# Patient Record
Sex: FEMALE | Race: WHITE | NOT HISPANIC OR LATINO
[De-identification: names, ages, dates, MRNs, and addresses within clinical notes are randomized per-mention and may not be internally consistent; named-entity substitution may affect disease eponyms.]

---

## 2017-02-08 ENCOUNTER — APPOINTMENT (OUTPATIENT)
Dept: GYNECOLOGIC ONCOLOGY | Facility: CLINIC | Age: 67
End: 2017-02-08

## 2017-02-08 VITALS
DIASTOLIC BLOOD PRESSURE: 89 MMHG | SYSTOLIC BLOOD PRESSURE: 158 MMHG | WEIGHT: 153 LBS | BODY MASS INDEX: 23.19 KG/M2 | HEART RATE: 73 BPM | HEIGHT: 68 IN

## 2017-03-07 ENCOUNTER — FORM ENCOUNTER (OUTPATIENT)
Age: 67
End: 2017-03-07

## 2017-03-08 ENCOUNTER — APPOINTMENT (OUTPATIENT)
Dept: MAMMOGRAPHY | Facility: CLINIC | Age: 67
End: 2017-03-08

## 2017-03-08 ENCOUNTER — OUTPATIENT (OUTPATIENT)
Dept: OUTPATIENT SERVICES | Facility: HOSPITAL | Age: 67
LOS: 1 days | End: 2017-03-08
Payer: COMMERCIAL

## 2017-03-08 DIAGNOSIS — C48.2 MALIGNANT NEOPLASM OF PERITONEUM, UNSPECIFIED: ICD-10-CM

## 2017-03-08 PROCEDURE — 77063 BREAST TOMOSYNTHESIS BI: CPT

## 2017-03-08 PROCEDURE — 77067 SCR MAMMO BI INCL CAD: CPT

## 2017-05-08 ENCOUNTER — OUTPATIENT (OUTPATIENT)
Dept: OUTPATIENT SERVICES | Facility: HOSPITAL | Age: 67
LOS: 1 days | Discharge: HOME | End: 2017-05-08

## 2017-05-08 DIAGNOSIS — C48.2 MALIGNANT NEOPLASM OF PERITONEUM, UNSPECIFIED: ICD-10-CM

## 2017-05-09 ENCOUNTER — APPOINTMENT (OUTPATIENT)
Dept: HEMATOLOGY ONCOLOGY | Facility: CLINIC | Age: 67
End: 2017-05-09

## 2017-05-09 ENCOUNTER — RESULT REVIEW (OUTPATIENT)
Age: 67
End: 2017-05-09

## 2017-05-09 VITALS
DIASTOLIC BLOOD PRESSURE: 70 MMHG | RESPIRATION RATE: 14 BRPM | OXYGEN SATURATION: 99 % | SYSTOLIC BLOOD PRESSURE: 120 MMHG | WEIGHT: 152.12 LBS | HEART RATE: 70 BPM | TEMPERATURE: 98.6 F | BODY MASS INDEX: 23.13 KG/M2

## 2017-05-09 LAB
HCT VFR BLD CALC: 41.6 % — SIGNIFICANT CHANGE UP (ref 34.5–45)
HGB BLD-MCNC: 14 G/DL — SIGNIFICANT CHANGE UP (ref 11.5–15.5)
MCHC RBC-ENTMCNC: 31.1 PG — SIGNIFICANT CHANGE UP (ref 27–34)
MCHC RBC-ENTMCNC: 33.6 G/DL — SIGNIFICANT CHANGE UP (ref 32–36)
MCV RBC AUTO: 92.5 FL — SIGNIFICANT CHANGE UP (ref 80–100)
PLATELET # BLD AUTO: 181 K/UL — SIGNIFICANT CHANGE UP (ref 150–400)
RBC # BLD: 4.5 M/UL — SIGNIFICANT CHANGE UP (ref 3.8–5.2)
RBC # FLD: 11.4 % — SIGNIFICANT CHANGE UP (ref 10.3–14.5)
WBC # BLD: 6.1 K/UL — SIGNIFICANT CHANGE UP (ref 3.8–10.5)
WBC # FLD AUTO: 6.1 K/UL — SIGNIFICANT CHANGE UP (ref 3.8–10.5)

## 2017-05-09 RX ORDER — FLUTICASONE PROPIONATE 50 UG/1
50 SPRAY, METERED NASAL
Qty: 16 | Refills: 0 | Status: ACTIVE | COMMUNITY
Start: 2016-12-12

## 2017-05-10 LAB
ALBUMIN SERPL ELPH-MCNC: 4.5 G/DL
ALP BLD-CCNC: 75 U/L
ALT SERPL-CCNC: 28 U/L
ANION GAP SERPL CALC-SCNC: 16 MMOL/L
AST SERPL-CCNC: 29 U/L
BILIRUB SERPL-MCNC: 1 MG/DL
BUN SERPL-MCNC: 22 MG/DL
CALCIUM SERPL-MCNC: 9.9 MG/DL
CANCER AG125 SERPL-ACNC: 8 U/ML
CHLORIDE SERPL-SCNC: 103 MMOL/L
CO2 SERPL-SCNC: 26 MMOL/L
CREAT SERPL-MCNC: 1 MG/DL
GLUCOSE SERPL-MCNC: 80 MG/DL
POTASSIUM SERPL-SCNC: 4.3 MMOL/L
PROT SERPL-MCNC: 6.8 G/DL
SODIUM SERPL-SCNC: 145 MMOL/L

## 2017-06-26 ENCOUNTER — RESULT REVIEW (OUTPATIENT)
Age: 67
End: 2017-06-26

## 2017-08-09 ENCOUNTER — APPOINTMENT (OUTPATIENT)
Dept: GYNECOLOGIC ONCOLOGY | Facility: CLINIC | Age: 67
End: 2017-08-09
Payer: COMMERCIAL

## 2017-08-09 VITALS
DIASTOLIC BLOOD PRESSURE: 88 MMHG | HEART RATE: 76 BPM | WEIGHT: 148 LBS | BODY MASS INDEX: 22.43 KG/M2 | HEIGHT: 68 IN | SYSTOLIC BLOOD PRESSURE: 158 MMHG

## 2017-08-09 PROCEDURE — 99214 OFFICE O/P EST MOD 30 MIN: CPT

## 2017-10-25 ENCOUNTER — FORM ENCOUNTER (OUTPATIENT)
Age: 67
End: 2017-10-25

## 2017-10-26 ENCOUNTER — OUTPATIENT (OUTPATIENT)
Dept: OUTPATIENT SERVICES | Facility: HOSPITAL | Age: 67
LOS: 1 days | End: 2017-10-26
Payer: COMMERCIAL

## 2017-10-26 ENCOUNTER — APPOINTMENT (OUTPATIENT)
Dept: CT IMAGING | Facility: CLINIC | Age: 67
End: 2017-10-26
Payer: COMMERCIAL

## 2017-10-26 DIAGNOSIS — C48.2 MALIGNANT NEOPLASM OF PERITONEUM, UNSPECIFIED: ICD-10-CM

## 2017-10-26 PROCEDURE — 74177 CT ABD & PELVIS W/CONTRAST: CPT | Mod: 26

## 2017-10-27 PROCEDURE — 74177 CT ABD & PELVIS W/CONTRAST: CPT

## 2017-10-27 PROCEDURE — 82565 ASSAY OF CREATININE: CPT

## 2017-11-03 ENCOUNTER — OUTPATIENT (OUTPATIENT)
Dept: OUTPATIENT SERVICES | Facility: HOSPITAL | Age: 67
LOS: 1 days | Discharge: HOME | End: 2017-11-03

## 2017-11-03 DIAGNOSIS — C48.2 MALIGNANT NEOPLASM OF PERITONEUM, UNSPECIFIED: ICD-10-CM

## 2017-11-07 ENCOUNTER — RESULT REVIEW (OUTPATIENT)
Age: 67
End: 2017-11-07

## 2017-11-07 ENCOUNTER — APPOINTMENT (OUTPATIENT)
Dept: HEMATOLOGY ONCOLOGY | Facility: CLINIC | Age: 67
End: 2017-11-07
Payer: MEDICARE

## 2017-11-07 VITALS
DIASTOLIC BLOOD PRESSURE: 90 MMHG | HEART RATE: 68 BPM | SYSTOLIC BLOOD PRESSURE: 158 MMHG | BODY MASS INDEX: 22.93 KG/M2 | OXYGEN SATURATION: 100 % | WEIGHT: 150.79 LBS | TEMPERATURE: 97.8 F | RESPIRATION RATE: 16 BRPM

## 2017-11-07 LAB
HCT VFR BLD CALC: 43.1 % — SIGNIFICANT CHANGE UP (ref 34.5–45)
HGB BLD-MCNC: 14.6 G/DL — SIGNIFICANT CHANGE UP (ref 11.5–15.5)
MCHC RBC-ENTMCNC: 31.6 PG — SIGNIFICANT CHANGE UP (ref 27–34)
MCHC RBC-ENTMCNC: 33.9 G/DL — SIGNIFICANT CHANGE UP (ref 32–36)
MCV RBC AUTO: 93.2 FL — SIGNIFICANT CHANGE UP (ref 80–100)
PLATELET # BLD AUTO: 205 K/UL — SIGNIFICANT CHANGE UP (ref 150–400)
RBC # BLD: 4.63 M/UL — SIGNIFICANT CHANGE UP (ref 3.8–5.2)
RBC # FLD: 11.5 % — SIGNIFICANT CHANGE UP (ref 10.3–14.5)
WBC # BLD: 6.3 K/UL — SIGNIFICANT CHANGE UP (ref 3.8–10.5)
WBC # FLD AUTO: 6.3 K/UL — SIGNIFICANT CHANGE UP (ref 3.8–10.5)

## 2017-11-07 PROCEDURE — 99214 OFFICE O/P EST MOD 30 MIN: CPT

## 2017-11-08 LAB
ALBUMIN SERPL ELPH-MCNC: 4.5 G/DL
ALP BLD-CCNC: 80 U/L
ALT SERPL-CCNC: 24 U/L
ANION GAP SERPL CALC-SCNC: 12 MMOL/L
AST SERPL-CCNC: 26 U/L
BILIRUB SERPL-MCNC: 0.8 MG/DL
BUN SERPL-MCNC: 22 MG/DL
CALCIUM SERPL-MCNC: 9.7 MG/DL
CANCER AG125 SERPL-ACNC: 10 U/ML
CHLORIDE SERPL-SCNC: 106 MMOL/L
CO2 SERPL-SCNC: 29 MMOL/L
CREAT SERPL-MCNC: 1.14 MG/DL
GLUCOSE SERPL-MCNC: 95 MG/DL
POTASSIUM SERPL-SCNC: 4.1 MMOL/L
PROT SERPL-MCNC: 7 G/DL
SODIUM SERPL-SCNC: 147 MMOL/L

## 2018-05-21 ENCOUNTER — OUTPATIENT (OUTPATIENT)
Dept: OUTPATIENT SERVICES | Facility: HOSPITAL | Age: 68
LOS: 1 days | End: 2018-05-21
Payer: MEDICARE

## 2018-05-21 ENCOUNTER — APPOINTMENT (OUTPATIENT)
Dept: MAMMOGRAPHY | Facility: CLINIC | Age: 68
End: 2018-05-21
Payer: MEDICARE

## 2018-05-21 DIAGNOSIS — Z00.8 ENCOUNTER FOR OTHER GENERAL EXAMINATION: ICD-10-CM

## 2018-05-21 PROCEDURE — 77067 SCR MAMMO BI INCL CAD: CPT

## 2018-05-21 PROCEDURE — 77063 BREAST TOMOSYNTHESIS BI: CPT | Mod: 26

## 2018-05-21 PROCEDURE — 77063 BREAST TOMOSYNTHESIS BI: CPT

## 2018-05-21 PROCEDURE — 77067 SCR MAMMO BI INCL CAD: CPT | Mod: 26

## 2018-08-15 ENCOUNTER — APPOINTMENT (OUTPATIENT)
Dept: GYNECOLOGIC ONCOLOGY | Facility: CLINIC | Age: 68
End: 2018-08-15

## 2019-06-17 ENCOUNTER — OUTPATIENT (OUTPATIENT)
Dept: OUTPATIENT SERVICES | Facility: HOSPITAL | Age: 69
LOS: 1 days | End: 2019-06-17
Payer: MEDICARE

## 2019-06-17 ENCOUNTER — APPOINTMENT (OUTPATIENT)
Dept: MAMMOGRAPHY | Facility: CLINIC | Age: 69
End: 2019-06-17
Payer: MEDICARE

## 2019-06-17 DIAGNOSIS — Z00.8 ENCOUNTER FOR OTHER GENERAL EXAMINATION: ICD-10-CM

## 2019-06-17 PROCEDURE — 77067 SCR MAMMO BI INCL CAD: CPT

## 2019-06-17 PROCEDURE — 77063 BREAST TOMOSYNTHESIS BI: CPT | Mod: 26

## 2019-06-17 PROCEDURE — 77063 BREAST TOMOSYNTHESIS BI: CPT

## 2019-06-17 PROCEDURE — 77067 SCR MAMMO BI INCL CAD: CPT | Mod: 26

## 2019-08-14 RX ORDER — POTASSIUM CHLORIDE 1500 MG/1
20 TABLET, FILM COATED, EXTENDED RELEASE ORAL AS DIRECTED
Qty: 4 | Refills: 0 | Status: ACTIVE | COMMUNITY
Start: 2019-08-14 | End: 1900-01-01

## 2019-08-14 RX ORDER — SODIUM PICOSULFATE, MAGNESIUM OXIDE, AND ANHYDROUS CITRIC ACID 10; 3.5; 12 MG/160ML; G/160ML; G/160ML
10-3.5-12 MG-GM LIQUID ORAL
Qty: 1 | Refills: 0 | Status: ACTIVE | COMMUNITY
Start: 2019-08-14 | End: 1900-01-01

## 2019-09-16 ENCOUNTER — APPOINTMENT (OUTPATIENT)
Dept: SURGICAL ONCOLOGY | Facility: HOSPITAL | Age: 69
End: 2019-09-16

## 2019-09-16 ENCOUNTER — OUTPATIENT (OUTPATIENT)
Dept: OUTPATIENT SERVICES | Facility: HOSPITAL | Age: 69
LOS: 1 days | End: 2019-09-16
Payer: MEDICARE

## 2019-09-16 DIAGNOSIS — Z12.11 ENCOUNTER FOR SCREENING FOR MALIGNANT NEOPLASM OF COLON: ICD-10-CM

## 2019-09-16 PROCEDURE — G0105: CPT

## 2019-09-16 PROCEDURE — 45378 DIAGNOSTIC COLONOSCOPY: CPT

## 2020-06-29 ENCOUNTER — OUTPATIENT (OUTPATIENT)
Dept: OUTPATIENT SERVICES | Facility: HOSPITAL | Age: 70
LOS: 1 days | End: 2020-06-29
Payer: MEDICARE

## 2020-06-29 ENCOUNTER — APPOINTMENT (OUTPATIENT)
Dept: MAMMOGRAPHY | Facility: CLINIC | Age: 70
End: 2020-06-29
Payer: MEDICARE

## 2020-06-29 DIAGNOSIS — Z00.8 ENCOUNTER FOR OTHER GENERAL EXAMINATION: ICD-10-CM

## 2020-06-29 PROCEDURE — 77063 BREAST TOMOSYNTHESIS BI: CPT

## 2020-06-29 PROCEDURE — 77067 SCR MAMMO BI INCL CAD: CPT | Mod: 26

## 2020-06-29 PROCEDURE — 77067 SCR MAMMO BI INCL CAD: CPT

## 2020-06-29 PROCEDURE — 77063 BREAST TOMOSYNTHESIS BI: CPT | Mod: 26

## 2021-08-02 ENCOUNTER — OUTPATIENT (OUTPATIENT)
Dept: OUTPATIENT SERVICES | Facility: HOSPITAL | Age: 71
LOS: 1 days | End: 2021-08-02
Payer: MEDICARE

## 2021-08-02 ENCOUNTER — APPOINTMENT (OUTPATIENT)
Dept: MAMMOGRAPHY | Facility: CLINIC | Age: 71
End: 2021-08-02
Payer: MEDICARE

## 2021-08-02 DIAGNOSIS — Z00.8 ENCOUNTER FOR OTHER GENERAL EXAMINATION: ICD-10-CM

## 2021-08-02 PROCEDURE — 77067 SCR MAMMO BI INCL CAD: CPT | Mod: 26

## 2021-08-02 PROCEDURE — 77063 BREAST TOMOSYNTHESIS BI: CPT

## 2021-08-02 PROCEDURE — 77067 SCR MAMMO BI INCL CAD: CPT

## 2021-08-02 PROCEDURE — 77063 BREAST TOMOSYNTHESIS BI: CPT | Mod: 26

## 2022-03-26 ENCOUNTER — APPOINTMENT (OUTPATIENT)
Dept: RADIOLOGY | Facility: CLINIC | Age: 72
End: 2022-03-26
Payer: MEDICARE

## 2022-03-26 ENCOUNTER — APPOINTMENT (OUTPATIENT)
Dept: ULTRASOUND IMAGING | Facility: CLINIC | Age: 72
End: 2022-03-26
Payer: MEDICARE

## 2022-03-26 ENCOUNTER — OUTPATIENT (OUTPATIENT)
Dept: OUTPATIENT SERVICES | Facility: HOSPITAL | Age: 72
LOS: 1 days | End: 2022-03-26
Payer: MEDICARE

## 2022-03-26 DIAGNOSIS — Z00.8 ENCOUNTER FOR OTHER GENERAL EXAMINATION: ICD-10-CM

## 2022-03-26 PROCEDURE — 76857 US EXAM PELVIC LIMITED: CPT | Mod: 26,59

## 2022-03-26 PROCEDURE — 71046 X-RAY EXAM CHEST 2 VIEWS: CPT

## 2022-03-26 PROCEDURE — 76856 US EXAM PELVIC COMPLETE: CPT

## 2022-03-26 PROCEDURE — 71046 X-RAY EXAM CHEST 2 VIEWS: CPT | Mod: 26

## 2022-03-26 PROCEDURE — 76700 US EXAM ABDOM COMPLETE: CPT | Mod: 26

## 2022-03-26 PROCEDURE — 72070 X-RAY EXAM THORAC SPINE 2VWS: CPT | Mod: 26

## 2022-03-26 PROCEDURE — 76830 TRANSVAGINAL US NON-OB: CPT | Mod: 26

## 2022-03-26 PROCEDURE — 72070 X-RAY EXAM THORAC SPINE 2VWS: CPT

## 2022-03-26 PROCEDURE — 76700 US EXAM ABDOM COMPLETE: CPT

## 2022-03-26 PROCEDURE — 76830 TRANSVAGINAL US NON-OB: CPT

## 2022-04-12 ENCOUNTER — APPOINTMENT (OUTPATIENT)
Dept: GASTROENTEROLOGY | Facility: CLINIC | Age: 72
End: 2022-04-12
Payer: MEDICARE

## 2022-04-12 VITALS
SYSTOLIC BLOOD PRESSURE: 140 MMHG | BODY MASS INDEX: 22.73 KG/M2 | WEIGHT: 150 LBS | HEIGHT: 68 IN | OXYGEN SATURATION: 99 % | HEART RATE: 89 BPM | DIASTOLIC BLOOD PRESSURE: 89 MMHG

## 2022-04-12 DIAGNOSIS — R10.11 RIGHT UPPER QUADRANT PAIN: ICD-10-CM

## 2022-04-12 DIAGNOSIS — R10.13 EPIGASTRIC PAIN: ICD-10-CM

## 2022-04-12 DIAGNOSIS — C48.2 MALIGNANT NEOPLASM OF PERITONEUM, UNSPECIFIED: ICD-10-CM

## 2022-04-12 DIAGNOSIS — R19.00 INTRA-ABDOMINAL AND PELVIC SWELLING, MASS AND LUMP, UNSPECIFIED SITE: ICD-10-CM

## 2022-04-12 DIAGNOSIS — Z92.21 PERSONAL HISTORY OF ANTINEOPLASTIC CHEMOTHERAPY: ICD-10-CM

## 2022-04-12 DIAGNOSIS — I10 ESSENTIAL (PRIMARY) HYPERTENSION: ICD-10-CM

## 2022-04-12 DIAGNOSIS — R17 UNSPECIFIED JAUNDICE: ICD-10-CM

## 2022-04-12 PROCEDURE — 99205 OFFICE O/P NEW HI 60 MIN: CPT

## 2022-04-12 NOTE — CONSULT LETTER
[Dear  ___] : Dear  [unfilled], [Consult Letter:] : I had the pleasure of evaluating your patient, [unfilled]. [Consult Closing:] : Thank you very much for allowing me to participate in the care of this patient.  If you have any questions, please do not hesitate to contact me. [Sincerely,] : Sincerely, [FreeTextEntry1] : Impression: The patient is a 71 year old woman with a PMH of ovarian CA with extensive peritoneal resection including removal of small liver lesions and subtotal colectomy to to carcinomatosis. She is referred for a Gastroenterology consultation for the evaluation of post prandial abdominal discomfort, bloating, and RUQ pain\par \par Plan:\par \par Gastroenterology: Ms. RICHARD's symptoms of post prandial vague abdominal discomfort, bloating and rUQ pain are certainly suggestive of biliary colic likely from cholelithiasis. She had a TB of 1.5 and will need repeat blood work. isolated rise in bilirubin but with ongain pain/ tenderness. will need to look for choledocholithiasis. Also with the pain radiating to her back, will need to check pancreatic enzymes to rule out biliary pancreatitis. \par \par If she has persistently elevated bilirubin or pancreatitis/ ongoing abnormal labs, she will need to have an MRCP to look for choledocholithiasis. This may warrant ERCP. Otherwise, she will proceed with cholecystectomy. She has an appointment with Dr Najera this coming Thursday. \par \par Reviewed and reconciled medications, allergies, PMHx, PSHx, SocHx, FMHx. Reviewed imaging, blood work, diagnostic testing, discussed with patient. Further recommendations pending results of above work up and evaluation.\par  [FreeTextEntry3] : Sakshi Ocampo MD\par Gastroenterology, Hepatology and Motility\par

## 2022-04-12 NOTE — REASON FOR VISIT
[Initial Visit] : an initial visit for [Abdominal Bloating and Discomfort] : abdominal bloating and discomfort [Abdominal Pain] : abdominal pain [GERD] : gastroesophageal reflux disease [Nausea] : nausea [Spouse] : spouse

## 2022-04-12 NOTE — PHYSICAL EXAM
[Normal] : Oriented to person, place and time with appropriate affect [de-identified] : tender to deep palpation RUQ

## 2022-04-13 ENCOUNTER — APPOINTMENT (OUTPATIENT)
Dept: MRI IMAGING | Facility: CLINIC | Age: 72
End: 2022-04-13
Payer: MEDICARE

## 2022-04-13 ENCOUNTER — NON-APPOINTMENT (OUTPATIENT)
Age: 72
End: 2022-04-13

## 2022-04-13 LAB
25(OH)D3 SERPL-MCNC: 53.2 NG/ML
ALBUMIN SERPL ELPH-MCNC: 4.4 G/DL
ALP BLD-CCNC: 225 U/L
ALT SERPL-CCNC: 295 U/L
AMYLASE/CREAT SERPL: 611 U/L
ANION GAP SERPL CALC-SCNC: 13 MMOL/L
AST SERPL-CCNC: 120 U/L
BASOPHILS # BLD AUTO: 0.04 K/UL
BASOPHILS NFR BLD AUTO: 0.5 %
BILIRUB DIRECT SERPL-MCNC: 0.5 MG/DL
BILIRUB SERPL-MCNC: 2.2 MG/DL
BUN SERPL-MCNC: 24 MG/DL
CALCIUM SERPL-MCNC: 9.9 MG/DL
CHLORIDE SERPL-SCNC: 101 MMOL/L
CO2 SERPL-SCNC: 26 MMOL/L
CREAT SERPL-MCNC: 1.15 MG/DL
EGFR: 52 ML/MIN/1.73M2
EOSINOPHIL # BLD AUTO: 0.14 K/UL
EOSINOPHIL NFR BLD AUTO: 1.6 %
ERYTHROCYTE [SEDIMENTATION RATE] IN BLOOD BY WESTERGREN METHOD: 61 MM/HR
GLUCOSE SERPL-MCNC: 116 MG/DL
HBV SURFACE AB SERPL IA-ACNC: <3 MIU/ML
HCT VFR BLD CALC: 47.1 %
HGB BLD-MCNC: 14.9 G/DL
IGA SER QL IEP: 269 MG/DL
IMM GRANULOCYTES NFR BLD AUTO: 0.2 %
LPL SERPL-CCNC: 409 U/L
LYMPHOCYTES # BLD AUTO: 1.44 K/UL
LYMPHOCYTES NFR BLD AUTO: 16.2 %
MAN DIFF?: NORMAL
MCHC RBC-ENTMCNC: 30.4 PG
MCHC RBC-ENTMCNC: 31.6 GM/DL
MCV RBC AUTO: 96.1 FL
MONOCYTES # BLD AUTO: 0.76 K/UL
MONOCYTES NFR BLD AUTO: 8.6 %
NEUTROPHILS # BLD AUTO: 6.48 K/UL
NEUTROPHILS NFR BLD AUTO: 72.9 %
PLATELET # BLD AUTO: 234 K/UL
POTASSIUM SERPL-SCNC: 4.2 MMOL/L
PROT SERPL-MCNC: 6.8 G/DL
RBC # BLD: 4.9 M/UL
RBC # FLD: 13.9 %
SODIUM SERPL-SCNC: 140 MMOL/L
VIT B12 SERPL-MCNC: 910 PG/ML
WBC # FLD AUTO: 8.88 K/UL

## 2022-04-13 PROCEDURE — A9585: CPT

## 2022-04-13 PROCEDURE — 74183 MRI ABD W/O CNTR FLWD CNTR: CPT

## 2022-04-14 ENCOUNTER — APPOINTMENT (OUTPATIENT)
Dept: SURGERY | Facility: CLINIC | Age: 72
End: 2022-04-14
Payer: MEDICARE

## 2022-04-14 VITALS — DIASTOLIC BLOOD PRESSURE: 85 MMHG | SYSTOLIC BLOOD PRESSURE: 130 MMHG | HEART RATE: 78 BPM | OXYGEN SATURATION: 99 %

## 2022-04-14 PROCEDURE — 99204 OFFICE O/P NEW MOD 45 MIN: CPT

## 2022-04-15 DIAGNOSIS — K85.90 ACUTE PANCREATITIS WITHOUT NECROSIS OR INFECTION, UNSPECIFIED: ICD-10-CM

## 2022-04-15 LAB
HDL-C: 79 MG/DL
HDL-P (TOTAL): 26.4 UMOL/L
LDL SIZE: 22 NM
LDL-C: 183 MG/DL
LDL-P: 1866 NMOL/L
LP-IR SCORE: <25
NMR CHOLESTEROL, TOTAL: 285 MG/DL
SMALL LDL-P: <90 NMOL/L
TRIGL SERPL-MCNC: 133 MG/DL
TTG IGA SER IA-ACNC: 1.2 U/ML
TTG IGA SER-ACNC: NEGATIVE

## 2022-04-18 ENCOUNTER — NON-APPOINTMENT (OUTPATIENT)
Age: 72
End: 2022-04-18

## 2022-04-18 ENCOUNTER — OUTPATIENT (OUTPATIENT)
Dept: OUTPATIENT SERVICES | Facility: HOSPITAL | Age: 72
LOS: 1 days | End: 2022-04-18
Payer: MEDICARE

## 2022-04-18 VITALS
HEIGHT: 68 IN | SYSTOLIC BLOOD PRESSURE: 184 MMHG | RESPIRATION RATE: 14 BRPM | TEMPERATURE: 98 F | HEART RATE: 66 BPM | OXYGEN SATURATION: 100 % | WEIGHT: 145.95 LBS | DIASTOLIC BLOOD PRESSURE: 62 MMHG

## 2022-04-18 DIAGNOSIS — Z01.818 ENCOUNTER FOR OTHER PREPROCEDURAL EXAMINATION: ICD-10-CM

## 2022-04-18 DIAGNOSIS — K81.1 CHRONIC CHOLECYSTITIS: ICD-10-CM

## 2022-04-18 LAB
ALBUMIN SERPL ELPH-MCNC: 3.6 G/DL — SIGNIFICANT CHANGE UP (ref 3.3–5)
ALP SERPL-CCNC: 128 U/L — HIGH (ref 40–120)
ALT FLD-CCNC: 79 U/L — HIGH (ref 12–78)
AMYLASE P1 CFR SERPL: 127 U/L — HIGH (ref 25–125)
ANION GAP SERPL CALC-SCNC: 5 MMOL/L — SIGNIFICANT CHANGE UP (ref 5–17)
AST SERPL-CCNC: 20 U/L — SIGNIFICANT CHANGE UP (ref 15–37)
BILIRUB SERPL-MCNC: 0.6 MG/DL — SIGNIFICANT CHANGE UP (ref 0.2–1.2)
BUN SERPL-MCNC: 17 MG/DL — SIGNIFICANT CHANGE UP (ref 7–23)
CALCIUM SERPL-MCNC: 9.8 MG/DL — SIGNIFICANT CHANGE UP (ref 8.5–10.1)
CHLORIDE SERPL-SCNC: 107 MMOL/L — SIGNIFICANT CHANGE UP (ref 96–108)
CO2 SERPL-SCNC: 30 MMOL/L — SIGNIFICANT CHANGE UP (ref 22–31)
CREAT SERPL-MCNC: 1 MG/DL — SIGNIFICANT CHANGE UP (ref 0.5–1.3)
EGFR: 61 ML/MIN/1.73M2 — SIGNIFICANT CHANGE UP
GLUCOSE SERPL-MCNC: 104 MG/DL — HIGH (ref 70–99)
HCT VFR BLD CALC: 42.4 % — SIGNIFICANT CHANGE UP (ref 34.5–45)
HGB BLD-MCNC: 14.4 G/DL — SIGNIFICANT CHANGE UP (ref 11.5–15.5)
LIDOCAIN IGE QN: 225 U/L — SIGNIFICANT CHANGE UP (ref 73–393)
MCHC RBC-ENTMCNC: 31.2 PG — SIGNIFICANT CHANGE UP (ref 27–34)
MCHC RBC-ENTMCNC: 34 GM/DL — SIGNIFICANT CHANGE UP (ref 32–36)
MCV RBC AUTO: 91.8 FL — SIGNIFICANT CHANGE UP (ref 80–100)
NRBC # BLD: 0 /100 WBCS — SIGNIFICANT CHANGE UP (ref 0–0)
PLATELET # BLD AUTO: 245 K/UL — SIGNIFICANT CHANGE UP (ref 150–400)
POTASSIUM SERPL-MCNC: 4 MMOL/L — SIGNIFICANT CHANGE UP (ref 3.5–5.3)
POTASSIUM SERPL-SCNC: 4 MMOL/L — SIGNIFICANT CHANGE UP (ref 3.5–5.3)
PROT SERPL-MCNC: 7.5 G/DL — SIGNIFICANT CHANGE UP (ref 6–8.3)
RBC # BLD: 4.62 M/UL — SIGNIFICANT CHANGE UP (ref 3.8–5.2)
RBC # FLD: 12.7 % — SIGNIFICANT CHANGE UP (ref 10.3–14.5)
SARS-COV-2 RNA SPEC QL NAA+PROBE: SIGNIFICANT CHANGE UP
SODIUM SERPL-SCNC: 142 MMOL/L — SIGNIFICANT CHANGE UP (ref 135–145)
WBC # BLD: 6.19 K/UL — SIGNIFICANT CHANGE UP (ref 3.8–10.5)
WBC # FLD AUTO: 6.19 K/UL — SIGNIFICANT CHANGE UP (ref 3.8–10.5)

## 2022-04-18 PROCEDURE — 36415 COLL VENOUS BLD VENIPUNCTURE: CPT

## 2022-04-18 PROCEDURE — 80053 COMPREHEN METABOLIC PANEL: CPT

## 2022-04-18 PROCEDURE — G0463: CPT

## 2022-04-18 PROCEDURE — 83690 ASSAY OF LIPASE: CPT

## 2022-04-18 PROCEDURE — 86901 BLOOD TYPING SEROLOGIC RH(D): CPT

## 2022-04-18 PROCEDURE — 85027 COMPLETE CBC AUTOMATED: CPT

## 2022-04-18 PROCEDURE — U0005: CPT

## 2022-04-18 PROCEDURE — 82150 ASSAY OF AMYLASE: CPT

## 2022-04-18 PROCEDURE — 93005 ELECTROCARDIOGRAM TRACING: CPT

## 2022-04-18 PROCEDURE — 86850 RBC ANTIBODY SCREEN: CPT

## 2022-04-18 PROCEDURE — 86900 BLOOD TYPING SEROLOGIC ABO: CPT

## 2022-04-18 PROCEDURE — U0003: CPT

## 2022-04-18 PROCEDURE — 93010 ELECTROCARDIOGRAM REPORT: CPT

## 2022-04-18 NOTE — H&P PST ADULT - NEGATIVE ENMT SYMPTOMS
no hearing difficulty/no sinus symptoms/no nasal congestion/no abnormal taste sensation/no dry mouth/no throat pain

## 2022-04-18 NOTE — H&P PST ADULT - NSICDXFAMILYHX_GEN_ALL_CORE_FT
FAMILY HISTORY:  Father  Still living? No  Family history of myocardial infarction at age less than 60, Age at diagnosis: 51-60    Mother  Still living? No  Family history of colon cancer, Age at diagnosis: 61-70

## 2022-04-18 NOTE — H&P PST ADULT - FALL HARM RISK - UNIVERSAL INTERVENTIONS
Bed in lowest position, wheels locked, appropriate side rails in place/Call bell, personal items and telephone in reach/Instruct patient to call for assistance before getting out of bed or chair/Non-slip footwear when patient is out of bed/[place] to call system/Physically safe environment - no spills, clutter or unnecessary equipment/Purposeful Proactive Rounding/Room/bathroom lighting operational, light cord in reach

## 2022-04-18 NOTE — H&P PST ADULT - FUNCTIONAL ASSESSMENT - DAILY ACTIVITY 1.
CERTIFICATE OF WORK      May 9, 2019      RE:   Robin Rehman  [address] [facility] [address]    To whom it may concern:    This is to certify that Robin TEAGUE Ori has been under Juliann Mas NP's care from 5/9/2019 and is excused from work on 5/7/19 and 5/9/19 to 5/12/19.    RESTRICTIONS:     REMARKS:       SIGNATURE:___________________________________________,   5/9/2019  Kassie Love MA/Juliann Mas NP   [facility] [facility], [address]  [address] [facility] [address] [phone]   4 = No assist / stand by assistance

## 2022-04-18 NOTE — H&P PST ADULT - ASSESSMENT
71 year old female PMH HTN, Palpitations, MVP, Hypercholesterolemia, Migraines, Ovarian Cancer (2012), Chronic Cholecystitis; scheduled for Laparoscopic Cholecystectomy - Possible open with Dr Daryl Najera on 4/20/2022.    71 year old female PMH HTN, Palpitations, MVP, Hypercholesterolemia, Migraines, Ovarian Cancer (2012), Chronic Cholecystitis; scheduled for Laparoscopic Cholecystectomy - Possible open with Dr Daryl Najera on 4/20/2022.  Surg. att.  Pt is known from the office visit. I have reviewed MRI with riaz Dodd and Huan. I also reviewed latest labs. Will proceed wih cholecystectomy later today.

## 2022-04-18 NOTE — H&P PST ADULT - NSICDXPASTMEDICALHX_GEN_ALL_CORE_FT
PAST MEDICAL HISTORY:  Chronic cholecystitis     H/O: HTN (hypertension)     Hay fever     Hypercholesterolemia Currently monitoring with diet modifications    Migraines     MVP (mitral valve prolapse)     Nephrolithiasis     Ovarian malignant neoplasm DX 2012    Peritoneal carcinomatosis     Sciatica     Seasonal allergies     Shoulder joint pain right shoulder pain

## 2022-04-18 NOTE — H&P PST ADULT - PROBLEM SELECTOR PLAN 1
PST Labs; CBC, CMP, Amylase, Lipase, Type & Screen, COVID-19 PCR swab, EKG. Medical clearance scheduled with PCP Dr Toi Marshall on 4/19/2022. Pt instructed to stop any NSAIDS/Herbal Supplements between now and procedure. May take Tylenol if needed for pain between now and procedure. Morning of procedure she may take her Atenolol with small sip of water as well as Xanax (if needed). PST Labs; CBC, CMP, Amylase, Lipase, Type & Screen, COVID-19 PCR swab, EKG. Medical clearance scheduled with PCP Dr Toi Marshall on 4/19/2022. Pt instructed to stop any NSAIDS/Herbal Supplements between now and procedure. May take Tylenol if needed for pain between now and procedure. Morning of procedure she may take her Atenolol with small sip of water as well as Xanax (if needed). Pre-op instructions as well as pre-op wash instructions given to pt with understanding verbalized. All questions addressed with pt prior to her leaving the PST department.

## 2022-04-18 NOTE — H&P PST ADULT - NSICDXPASTSURGICALHX_GEN_ALL_CORE_FT
PAST SURGICAL HISTORY:  H/O colonoscopy     History of colon resection     S/P left oophorectomy     S/P right oophorectomy     S/P ROSALIA (total abdominal hysterectomy)

## 2022-04-18 NOTE — H&P PST ADULT - NSANTHOSAYNRD_GEN_A_CORE
No. WALE screening performed.  STOP BANG Legend: 0-2 = LOW Risk; 3-4 = INTERMEDIATE Risk; 5-8 = HIGH Risk

## 2022-04-18 NOTE — H&P PST ADULT - HISTORY OF PRESENT ILLNESS
71 year old female PMH HTN, Palpitations, MVP, Hypercholesterolemia, Migraines, Ovarian Cancer (2012), Seasonal Allergies,  Chronic Cholecystitis; presents today for PST prior to laparoscopic Cholecystectomy - Possible open with Dr Daryl Najera on 4/20/2022. Pt notes "I have had a couple of gallbladder attacks that I thought was a stomach virus; one in May 2021 then Dec 2021, then January 2022, then last week." pt notes pain would start in the back and radiate to the front and get nauseous with vomiting with episodes lasting 5-6 hours.  Pt notes her PCP Dr Marshall sent her for a sonogram which showed stones Pt was then seen by her GI doctor Dr Shaw and from there was sent for surgical evaluation with Dr Najera. Following evaluation, exam and discussions regarding treatment options pt is electing for scheduled procedure.

## 2022-04-19 ENCOUNTER — NON-APPOINTMENT (OUTPATIENT)
Age: 72
End: 2022-04-19

## 2022-04-19 ENCOUNTER — TRANSCRIPTION ENCOUNTER (OUTPATIENT)
Age: 72
End: 2022-04-19

## 2022-04-19 NOTE — ASU PATIENT PROFILE, ADULT - FALL HARM RISK - UNIVERSAL INTERVENTIONS
Bed in lowest position, wheels locked, appropriate side rails in place/Call bell, personal items and telephone in reach/Instruct patient to call for assistance before getting out of bed or chair/Non-slip footwear when patient is out of bed/[place] to call system/Physically safe environment - no spills, clutter or unnecessary equipment/Purposeful Proactive Rounding/Room/bathroom lighting operational, light cord in reach

## 2022-04-19 NOTE — ASU PATIENT PROFILE, ADULT - PRO ARRIVE FROM
----- Message from Lalita Duran sent at 7/17/2020  1:40 PM CDT -----  Is it ok if I call patient to schedule referral to Sleep Disorders?     home

## 2022-04-20 ENCOUNTER — APPOINTMENT (OUTPATIENT)
Dept: SURGERY | Facility: HOSPITAL | Age: 72
End: 2022-04-20
Payer: MEDICARE

## 2022-04-20 ENCOUNTER — TRANSCRIPTION ENCOUNTER (OUTPATIENT)
Age: 72
End: 2022-04-20

## 2022-04-20 ENCOUNTER — INPATIENT (INPATIENT)
Facility: HOSPITAL | Age: 72
LOS: 0 days | Discharge: HOME | DRG: 422 | End: 2022-04-21
Attending: SPECIALIST | Admitting: SPECIALIST
Payer: MEDICARE

## 2022-04-20 VITALS
DIASTOLIC BLOOD PRESSURE: 91 MMHG | RESPIRATION RATE: 14 BRPM | WEIGHT: 145.95 LBS | HEART RATE: 59 BPM | HEIGHT: 68 IN | TEMPERATURE: 98 F | OXYGEN SATURATION: 99 % | SYSTOLIC BLOOD PRESSURE: 205 MMHG

## 2022-04-20 DIAGNOSIS — K81.1 CHRONIC CHOLECYSTITIS: ICD-10-CM

## 2022-04-20 PROCEDURE — 99223 1ST HOSP IP/OBS HIGH 75: CPT | Mod: GC

## 2022-04-20 PROCEDURE — 99222 1ST HOSP IP/OBS MODERATE 55: CPT

## 2022-04-20 PROCEDURE — ZZZZZ: CPT

## 2022-04-20 DEVICE — CLIP APPLIER COVIDIEN ENDOCLIP 10MM LARGE: Type: IMPLANTABLE DEVICE | Status: FUNCTIONAL

## 2022-04-20 DEVICE — CLIP APPLIER COVIDIEN ENDOCLIP II 10MM MED/LG: Type: IMPLANTABLE DEVICE | Status: FUNCTIONAL

## 2022-04-20 RX ORDER — HYDRALAZINE HCL 50 MG
25 TABLET ORAL EVERY 8 HOURS
Refills: 0 | Status: DISCONTINUED | OUTPATIENT
Start: 2022-04-20 | End: 2022-04-21

## 2022-04-20 RX ORDER — CEFAZOLIN SODIUM 1 G
2000 VIAL (EA) INJECTION ONCE
Refills: 0 | Status: COMPLETED | OUTPATIENT
Start: 2022-04-21 | End: 2022-04-21

## 2022-04-20 RX ORDER — ONDANSETRON 8 MG/1
4 TABLET, FILM COATED ORAL EVERY 6 HOURS
Refills: 0 | Status: DISCONTINUED | OUTPATIENT
Start: 2022-04-20 | End: 2022-04-21

## 2022-04-20 RX ORDER — ZOLPIDEM TARTRATE 10 MG/1
5 TABLET ORAL AT BEDTIME
Refills: 0 | Status: DISCONTINUED | OUTPATIENT
Start: 2022-04-20 | End: 2022-04-21

## 2022-04-20 RX ORDER — CEFAZOLIN SODIUM 1 G
2000 VIAL (EA) INJECTION ONCE
Refills: 0 | Status: COMPLETED | OUTPATIENT
Start: 2022-04-20 | End: 2022-04-20

## 2022-04-20 RX ORDER — OXYCODONE HYDROCHLORIDE 5 MG/1
5 TABLET ORAL ONCE
Refills: 0 | Status: DISCONTINUED | OUTPATIENT
Start: 2022-04-20 | End: 2022-04-20

## 2022-04-20 RX ORDER — ONDANSETRON 8 MG/1
4 TABLET, FILM COATED ORAL ONCE
Refills: 0 | Status: DISCONTINUED | OUTPATIENT
Start: 2022-04-20 | End: 2022-04-20

## 2022-04-20 RX ORDER — ATENOLOL 25 MG/1
25 TABLET ORAL DAILY
Refills: 0 | Status: DISCONTINUED | OUTPATIENT
Start: 2022-04-20 | End: 2022-04-21

## 2022-04-20 RX ORDER — SODIUM CHLORIDE 9 MG/ML
1000 INJECTION, SOLUTION INTRAVENOUS
Refills: 0 | Status: DISCONTINUED | OUTPATIENT
Start: 2022-04-20 | End: 2022-04-21

## 2022-04-20 RX ORDER — ALPRAZOLAM 0.25 MG
0.25 TABLET ORAL DAILY
Refills: 0 | Status: DISCONTINUED | OUTPATIENT
Start: 2022-04-20 | End: 2022-04-21

## 2022-04-20 RX ORDER — SODIUM CHLORIDE 9 MG/ML
1000 INJECTION, SOLUTION INTRAVENOUS
Refills: 0 | Status: DISCONTINUED | OUTPATIENT
Start: 2022-04-20 | End: 2022-04-20

## 2022-04-20 RX ORDER — HYDRALAZINE HCL 50 MG
10 TABLET ORAL ONCE
Refills: 0 | Status: COMPLETED | OUTPATIENT
Start: 2022-04-20 | End: 2022-04-20

## 2022-04-20 RX ORDER — HYDROMORPHONE HYDROCHLORIDE 2 MG/ML
0.5 INJECTION INTRAMUSCULAR; INTRAVENOUS; SUBCUTANEOUS
Refills: 0 | Status: DISCONTINUED | OUTPATIENT
Start: 2022-04-20 | End: 2022-04-20

## 2022-04-20 RX ADMIN — Medication 0.25 MILLIGRAM(S): at 21:40

## 2022-04-20 RX ADMIN — Medication 10 MILLIGRAM(S): at 17:21

## 2022-04-20 RX ADMIN — Medication 25 MILLIGRAM(S): at 17:38

## 2022-04-20 RX ADMIN — SODIUM CHLORIDE 75 MILLILITER(S): 9 INJECTION, SOLUTION INTRAVENOUS at 15:13

## 2022-04-20 RX ADMIN — SODIUM CHLORIDE 75 MILLILITER(S): 9 INJECTION, SOLUTION INTRAVENOUS at 21:43

## 2022-04-20 RX ADMIN — Medication 25 MILLIGRAM(S): at 20:17

## 2022-04-20 RX ADMIN — SODIUM CHLORIDE 75 MILLILITER(S): 9 INJECTION, SOLUTION INTRAVENOUS at 17:21

## 2022-04-20 RX ADMIN — ZOLPIDEM TARTRATE 5 MILLIGRAM(S): 10 TABLET ORAL at 21:40

## 2022-04-20 NOTE — PATIENT PROFILE ADULT - FALL HARM RISK - UNIVERSAL INTERVENTIONS
Bed in lowest position, wheels locked, appropriate side rails in place/Call bell, personal items and telephone in reach/Instruct patient to call for assistance before getting out of bed or chair/Non-slip footwear when patient is out of bed/[place] to call system/Physically safe environment - no spills, clutter or unnecessary equipment/Purposeful Proactive Rounding/Room/bathroom lighting operational, light cord in reach

## 2022-04-20 NOTE — CONSULT NOTE ADULT - NS ATTEND AMEND GEN_ALL_CORE FT
here for hussein tolentinoe and found to have SBP>200  give hydralazine 10 iv x 1 and can use po hydralazine in short term  post-operatively, will change this to an arb  will cont her atenolol with hold parameters  no sign of acute ischemia or volume overload  if bp is better, will proceed with clearance in am

## 2022-04-20 NOTE — CONSULT NOTE ADULT - SUBJECTIVE AND OBJECTIVE BOX
Initial Hospitalist Consult Note  Department of Medicine at [facility]    Patient is a 71y old  Female who presents with a chief complaint of elective lap dmitriy  HPI: 70 yo F with PMHx of HTN, anxiety, migraines, ovarian ca s/p hysterectomy, biliary colic presented to [facility] for elective cholecystectomy. In PACU, pre-op, pt was noted to have severely elevated blood pressure readings that were persistent. Surgery cancelled and cardiology and medicine consulted for blood pressure management. Pt seen and examined at bedside. Feels well. Does have a headache which has been persistent this past week. Denies sob or chest pain. Does admit to anxiety because last time she had surgery, she said they found out that she had ovarian cancer and had to have multiple surgeries done so she has a lot of trauma from that time and is worried that they may find something else during this surgery.      I&O's Summary      PAST MEDICAL & SURGICAL HISTORY:  Ovarian malignant neoplasm  DX     H/O: HTN (hypertension)    MVP (mitral valve prolapse)    Sciatica    Shoulder joint pain  right shoulder pain    Peritoneal carcinomatosis    Nephrolithiasis    Hypercholesterolemia  Currently monitoring with diet modifications    Hay fever    Seasonal allergies    Migraines    Chronic cholecystitis    H/O colonoscopy    S/P left oophorectomy    S/P right oophorectomy    S/P ROSALIA (total abdominal hysterectomy)    History of colon resection        Allergies    adhesives (Other)  dairy products (Other)  SEASONAL ALLERGIES - Post Nasal Drip (Other)  Sulfur (Rash)    Intolerances        SOCIAL HISTORY  Alcohol: denies  Tobacco: occasional  Illicit substance use: denies    FAMILY HISTORY:  Family history of myocardial infarction at age less than 60 (Father)  Father -  Age 70    Family history of colon cancer (Mother)  Mother -  Age 69        Home Medications:  Ambien 10 mg oral tablet: 1 tab(s) orally once a day (at bedtime), As Needed (2022 15:11)  atenolol 25 mg oral tablet: 1 tab(s) orally once a day (2022 15:11)  Excedrin oral tablet: 1 tab(s) orally prn (2022 15:11)  Tylenol 325 mg oral tablet: 2 tab(s) orally every 4 hours (2022 16:46)  Xanax 0.25 mg oral tablet:  orally once a day, As Needed (2022 15:11)        LABS:  labs reviewed from             CAPILLARY BLOOD GLUCOSE                MEDICATIONS  (STANDING):  hydrALAZINE 25 milliGRAM(s) Oral every 8 hours  lactated ringers. 1000 milliLiter(s) (75 mL/Hr) IV Continuous <Continuous>  lactated ringers. 1000 milliLiter(s) (75 mL/Hr) IV Continuous <Continuous>    MEDICATIONS  (PRN):  HYDROmorphone  Injectable 0.5 milliGRAM(s) IV Push every 10 minutes PRN Severe Pain (7 - 10)  ondansetron Injectable 4 milliGRAM(s) IV Push once PRN Nausea and/or Vomiting  oxyCODONE    IR 5 milliGRAM(s) Oral once PRN Moderate Pain (4 - 6)      REVIEW OF SYSTEMS:  CONSTITUTIONAL: denies fever, chills, fatigue, weakness  HEENT: denies blurred vision, sore throat  SKIN: denies new lesions, rash  CARDIOVASCULAR: denies chest pain, chest pressure, palpitations  RESPIRATORY: denies shortness of breath, sputum production  GASTROINTESTINAL: denies nausea, vomiting, diarrhea, abdominal pain  GENITOURINARY: denies dysuria, discharge  NEUROLOGICAL: denies numbness, headache, focal weakness  MUSCULOSKELETAL: denies new joint pain, muscle aches  HEMATOLOGIC: denies gross bleeding, bruising  LYMPHATICS: denies enlarged lymph nodes, extremity swelling  PSYCHIATRIC: denies recent changes in anxiety, depression  ENDOCRINOLOGIC: denies sweating, cold or heat intolerance    RADIOLOGY & ADDITIONAL TESTS:    EKG: NSR    Imaging Personally Reviewed:  [x] YES  [ ] NO    EKG Personally Reviewed:  [x] YES  [ ] NO    Consultant(s) Notes Reviewed:  [x] YES  [ ] NO        PHYSICAL EXAM:  T(F): 97.9 (22 @ 16:30), Max: 98.1 (22 @ 15:12)  HR: 58 (22 @ 16:45) (56 - 59)  BP: 199/89 (22 @ 16:45) (195/89 - 205/91)  RR: 10 (22 @ 16:45) (10 - 14)  SpO2: 98% (22 @ 16:45) (98% - 99%)    GENERAL: NAD, well-groomed, well-developed  HEAD:  Atraumatic, Normocephalic  EYES: EOMI, PERRL, conjunctiva and sclera clear  ENMT: No tonsillar erythema, exudates, or enlargement; Moist mucous membranes  NECK: Supple, No JVD, Normal thyroid  NERVOUS SYSTEM:  Alert & Oriented X3, Moves all extremities, Sensation to light touch intact  CHEST/LUNG: Clear to auscultation bilaterally; No wheezes, rales or rhonchi  HEART: RRR, S1, S2; No murmurs, rubs, or gallops  ABDOMEN: Soft, Nontender, Nondistended; Bowel sounds present  EXTREMITIES:  2+ Peripheral Pulses, No clubbing, cyanosis, or edema  LYMPH: No lymphadenopathy noted  SKIN: No rashes or lesions    Care Discussed with Consultants/Other Providers [x] YES  [ ] NO Initial Hospitalist Consult Note  Department of Medicine at [facility]    Patient is a 71y old  Female who presents with a chief complaint of elective lap dmitriy  HPI: 70 yo F with PMHx of HTN, anxiety, migraines, ovarian ca s/p hysterectomy, biliary colic presented to [facility] for elective cholecystectomy. In PACU, pre-op, pt was noted to have severely elevated blood pressure readings that were persistent. Surgery cancelled and cardiology and medicine consulted for blood pressure management. Pt seen and examined at bedside. Feels well. Does have a headache which has been persistent this past week. Denies sob or chest pain. Does admit to anxiety because last time she had surgery, she said they found out that she had ovarian cancer and had to have multiple surgeries done so she has a lot of trauma from that time and is worried that they may find something else during this surgery.  Denies nausea, vomiting, chest pain, SOB, palpitations, abdominal pain, constipation, diarrhea, melena, hematochezia, dysuria.   Denies past hx of CAD, DM2, CHF, TIA, CVA. She can ambulate 2 flights of stairs without having chest pain or SOB. No hx of cardiac cath.     I&O's Summary      PAST MEDICAL & SURGICAL HISTORY:  Ovarian malignant neoplasm  DX     H/O: HTN (hypertension)    MVP (mitral valve prolapse)    Sciatica    Shoulder joint pain  right shoulder pain    Peritoneal carcinomatosis    Nephrolithiasis    Hypercholesterolemia  Currently monitoring with diet modifications    Hay fever    Seasonal allergies    Migraines    Chronic cholecystitis    H/O colonoscopy    S/P left oophorectomy    S/P right oophorectomy    S/P ROSALIA (total abdominal hysterectomy)    History of colon resection        Allergies    adhesives (Other)  dairy products (Other)  SEASONAL ALLERGIES - Post Nasal Drip (Other)  Sulfur (Rash)          SOCIAL HISTORY  Alcohol: denies  Tobacco: occasional  Illicit substance use: denies    FAMILY HISTORY:  Family history of myocardial infarction at age less than 60 (Father)  Father -  Age 70    Family history of colon cancer (Mother)  Mother -  Age 69        Home Medications:  Ambien 10 mg oral tablet: 1 tab(s) orally once a day (at bedtime), As Needed (2022 15:11)  atenolol 25 mg oral tablet: 1 tab(s) orally once a day (2022 15:11)  Excedrin oral tablet: 1 tab(s) orally prn (2022 15:11)  Tylenol 325 mg oral tablet: 2 tab(s) orally every 4 hours (2022 16:46)  Xanax 0.25 mg oral tablet:  orally once a day, As Needed (2022 15:11)        LABS:  labs reviewed from             CAPILLARY BLOOD GLUCOSE                MEDICATIONS  (STANDING):  hydrALAZINE 25 milliGRAM(s) Oral every 8 hours  lactated ringers. 1000 milliLiter(s) (75 mL/Hr) IV Continuous <Continuous>  lactated ringers. 1000 milliLiter(s) (75 mL/Hr) IV Continuous <Continuous>    MEDICATIONS  (PRN):  HYDROmorphone  Injectable 0.5 milliGRAM(s) IV Push every 10 minutes PRN Severe Pain (7 - 10)  ondansetron Injectable 4 milliGRAM(s) IV Push once PRN Nausea and/or Vomiting  oxyCODONE    IR 5 milliGRAM(s) Oral once PRN Moderate Pain (4 - 6)      REVIEW OF SYSTEMS:  CONSTITUTIONAL: denies fever, chills, fatigue, weakness  HEENT: denies blurred vision, sore throat  SKIN: denies new lesions, rash  CARDIOVASCULAR: denies chest pain, chest pressure, palpitations  RESPIRATORY: denies shortness of breath, sputum production  GASTROINTESTINAL: denies nausea, vomiting, diarrhea, abdominal pain  GENITOURINARY: denies dysuria, discharge  NEUROLOGICAL: denies numbness, headache, focal weakness  MUSCULOSKELETAL: denies new joint pain, muscle aches  HEMATOLOGIC: denies gross bleeding, bruising  LYMPHATICS: denies enlarged lymph nodes, extremity swelling  PSYCHIATRIC: denies recent changes in anxiety, depression  ENDOCRINOLOGIC: denies sweating, cold or heat intolerance    RADIOLOGY & ADDITIONAL TESTS:    EKG: NSR    Imaging Personally Reviewed:  [x] YES  [ ] NO    EKG Personally Reviewed:  [x] YES  [ ] NO    Consultant(s) Notes Reviewed:  [x] YES  [ ] NO        PHYSICAL EXAM:  T(F): 97.9 (22 @ 16:30), Max: 98.1 (22 @ 15:12)  HR: 58 (22 @ 16:45) (56 - 59)  BP: 199/89 (22 @ 16:45) (195/89 - 205/91)  RR: 10 (22 @ 16:45) (10 - 14)  SpO2: 98% (22 @ 16:45) (98% - 99%)    GENERAL: NAD, well-groomed, well-developed  HEAD:  Atraumatic, Normocephalic  EYES: EOMI, PERRL, conjunctiva and sclera clear  ENMT: No tonsillar erythema, exudates, or enlargement; Moist mucous membranes  NECK: Supple, No JVD, Normal thyroid  NERVOUS SYSTEM:  Alert & Oriented X3, Moves all extremities, Sensation to light touch intact  CHEST/LUNG: Clear to auscultation bilaterally; No wheezes, rales or rhonchi  HEART: RRR, S1, S2; No murmurs, rubs, or gallops  ABDOMEN: Soft, Nontender, Nondistended; Bowel sounds present  EXTREMITIES:  2+ Peripheral Pulses, No clubbing, cyanosis, or edema  LYMPH: No lymphadenopathy noted  SKIN: No rashes or lesions    Care Discussed with Consultants/Other Providers [x] YES  [ ] NO

## 2022-04-20 NOTE — CONSULT NOTE ADULT - ASSESSMENT
71 year old female with PMHX HTN (on atenolol at home x 25 years) peritoneal carcinoma, migraines, and anxiety, presented today for scheduled lap dmitriy, with HTN urgency systolics in 200's intra op.     HTN urgency/ pre-op   - presented for sched lap dmitriy  - Patients BP in OR  was 200/ 90's, current BP is 194/87 on exam in PACU   - give hydralazine 10 mg IV x 1 now and start hydralazine 25 mg PO TID with hold parameters  - if BP remains elevated can start valsartan 160 mg PO daily  - HR 60's, continue routine hemodynamics, hold home dose Atenolol  - check cardiac enzymes  - EKG: NSR HR 62, no ischemic changes noted (from PST), repeat EKG, no anginal complaints   - CXR: no acute evidence of active chest disease  - admit to remote tele   - Denies chest pain, palpitation, SOB, syncope, dizziness, lightheadedness, orthopnea, PND, GRANT and edema  - Patient euvolemic on examination with no overt signs of heart failure or cardiac ischemia.   - No severe valvular abnormalities noted on examination   - will reassess BP in AM for clearance prior lap dmitriy     - Monitor and replete Lytes. Keep K > 4 and Mg > 2  - All other medical needs as per primary team.  - Other cardiovascular workup will depend on clinical course.  - Will continue to follow.    Jayda Vaz [facility]  Nurse Practitioner - Cardiology   Spectra #[id number]

## 2022-04-20 NOTE — CHART NOTE - NSCHARTNOTEFT_GEN_A_CORE
PATIENT IS SCHEDULED FOR A ELECTIVE LAPAROSCOPIC CHOLECYSTECTOMY POSSIBLE OPEN.  SHE IS HYPERTENSIVE IN THE HOLDING ROOM  SURGERY IS CANCELLED FOR NOW  CARDIOLOGY CONSULT DR. OH CALLED  MEDICAL CONSULT DR. LIZABETH ARNOLD CALLED   NPO AFTER MIDNIGHT EXCEPT MEDICATION FOR POSSIBLE LAPAROSCOPIC CHOLECYSTECTOMY  IN AM IF CLEARED

## 2022-04-20 NOTE — CONSULT NOTE ADULT - SUBJECTIVE AND OBJECTIVE BOX
[facility] - Usha Mckeon, Andrea Zimmer, Christian Higgins, Sal Brumfield  Office Number: [phone]    Initial Consult Note  This is a 71 year old female with PMHX HTN (on atenolol at home x 25 years) peritoneal carcinoma, migraines, and anxiety, presented today for scheduled lap dmitriy, cancelled due to systolics repeatedly over 200s intra op. Patient seen and examined in PACU, states that she has been having tension headaches and her BP has been elevated in 180's systolics x 1 week. Also states that she took her atenolol this AM.  As per pt, she was supposed to follow up with her cardiologist  but missed the appointment due to the surgery. She denies CP, palpitations, dizziness, nausea /vomiting. She is not in acute distress, lying in the stretcher with c/o headache that subside s/p versed.  /87, HR: 60, SPO2 95% on room air. Cardiology consulted for management of BP prior to surgery.    CHIEF COMPLAINT: Patient is a 71y old  Female who presents with a chief complaint of gallbadder removal    Allergies    adhesives (Other)  dairy products (Other)  SEASONAL ALLERGIES - Post Nasal Drip (Other)  Sulfur (Rash)    Intolerances      PAST MEDICAL & SURGICAL HISTORY:  Ovarian malignant neoplasm  DX     H/O: HTN (hypertension)    MVP (mitral valve prolapse)    Sciatica    Shoulder joint pain  right shoulder pain    Peritoneal carcinomatosis    Nephrolithiasis    Hypercholesterolemia  Currently monitoring with diet modifications    Hay fever    Seasonal allergies    Migraines    Chronic cholecystitis    H/O colonoscopy    S/P left oophorectomy    S/P right oophorectomy    S/P ROSALIA (total abdominal hysterectomy)    History of colon resection      MEDICATIONS  (STANDING):  hydrALAZINE 25 milliGRAM(s) Oral every 8 hours  lactated ringers. 1000 milliLiter(s) (75 mL/Hr) IV Continuous <Continuous>  lactated ringers. 1000 milliLiter(s) (75 mL/Hr) IV Continuous <Continuous>    MEDICATIONS  (PRN):  HYDROmorphone  Injectable 0.5 milliGRAM(s) IV Push every 10 minutes PRN Severe Pain (7 - 10)  ondansetron Injectable 4 milliGRAM(s) IV Push once PRN Nausea and/or Vomiting  oxyCODONE    IR 5 milliGRAM(s) Oral once PRN Moderate Pain (4 - 6)    FAMILY HISTORY:  Family history of myocardial infarction at age less than 60 (Father)  Father -  Age 70    Family history of colon cancer (Mother)  Mother -  Age 69      SOCIAL HISTORY    Marital Status:   Occupation:   Lives with:     SUBSTANCE USE  Tobacco Usage:  (x ) None ( ) never smoked   ( ) former smoker  ( ) current smoker; Packs per day:   Alcohol Usage: (x ) none  ( ) occasional ( ) 2-3 times a week ( ) daily; Last drink:   Recreational drugs ( x) None    REVIEW OF SYSTEMS   All other review of systems is negative unless indicated above.    VITAL SIGNS:   Vital Signs Last 24 Hrs  T(C): 36.6 (2022 16:30), Max: 36.7 (2022 15:12)  T(F): 97.9 (2022 16:30), Max: 98.1 (2022 15:12)  HR: 58 (2022 16:45) (56 - 59)  BP: 199/89 (2022 16:45) (195/89 - 205/91)  BP(mean): --  RR: 10 (2022 16:45) (10 - 14)  SpO2: 98% (2022 16:45) (98% - 99%)    Physical Exam:    Appearance: NAD, no distress, alert, well developed   HEENT: Moist Mucous Membranes, Anicteric  Cardiovascular: Regular rate and rhythm, Normal S1 S2, No JVD, No murmurs, No rubs, gallops or clicks  Respiratory: Lungs clear to auscultation. No rales, No rhonchi, No wheezing. No tenderness to palpation  Gastrointestinal:  Soft, Non-tender, + BS, mild tenderness   Neurologic: Non-focal  Skin: Warm and dry, No rashes, No ecchymosis, No cyanosis, No ulcers   Musculoskeletal: No clubbing, No cyanosis  Vascular: Peripheral pulses palpable 2+ bilaterally  Psychiatry: Mood & affect appropriate  Lymph: No peripheral edema.     I&O's Summary      LABS: All Labs Reviewed:                        14.4   6.19  )-----------( 245      ( 2022 15:54 )             42.4     2022 15:54    142    |  107    |  17     ----------------------------<  104    4.0     |  30     |  1.00     Ca    9.8        2022 15:54    TPro  7.5    /  Alb  3.6    /  TBili  0.6    /  DBili  x      /  AST  20     /  ALT  79     /  AlkPhos  128    2022 15:54          Blood Culture:           12 Lead ECG:   Ventricular Rate 62 BPM    Atrial Rate 62 BPM    P-R Interval 168 ms    QRS Duration 96 ms    Q-T Interval 404 ms    QTC Calculation(Bazett) 410 ms    P Axis 49 degrees    R Axis -3 degrees    T Axis 19 degrees    Diagnosis Line Normal sinus rhythm  Normal ECG  No previous ECGs available  Confirmed by Cachorro Zimmer MD ([id number]) on 2022 10:20:23 AM (22 @ 15:29)    < from: Xray Chest 2 Views PA/Lat (22 @ 08:14) >    EXAM: [id number] - XR CHEST PA LAT 2V  - ORDERED BY: ARRON SHI      PROCEDURE DATE:  2022           INTERPRETATION:  PA and lateral chest radiographs    COMPARISON: 2014 chest radiograph.    CLINICAL INFORMATION: Chest Pain.    FINDINGS:    PULMONARY: The airway is midline.  There are no airspace consolidations.  No pleural effusion or pneumothorax.    HEART/VASCULAR: The heart size and mediastinum configuration are within   the limits of normal.    BONES: The visualized osseus structures are intact.    IMPRESSION:    No radiographic evidence of active chest disease..    --- End of Report ---               JONATHAN JEROME MD; Attending Radiologist   This document has been electronically signed. Mar 26 2022 11:05AM    < end of copied text >

## 2022-04-20 NOTE — CONSULT NOTE ADULT - ASSESSMENT
72 yo F with PMHx of HTN, anxiety, migraines, ovarian ca s/p hysterectomy, biliary colic presented to [facility] for elective cholecystectomy, found to have severely elevated blood pressure, medicine consulted for blood pressure management.    sudden accelerated HTN/hypertensive urgency  -pt attributes to anxiety, has a longstanding hx of HTN that has been stable on atenolol 25 mg daily  -continue atenolol 25 mg daily  -s/p hydralazine 10 mg start hydralazine 25 mg TID   72 yo F with PMHx of HTN, anxiety, migraines, ovarian ca s/p hysterectomy, biliary colic presented to [facility] for elective cholecystectomy, found to have severely elevated blood pressure, medicine consulted for blood pressure management.    sudden accelerated HTN/hypertensive urgency  -pt attributes to anxiety, has a longstanding hx of HTN that has been stable on atenolol 25 mg daily  -continue atenolol 25 mg daily  -s/p hydralazine 10 mg start hydralazine 25 mg TID  -monitor routine hemodynamics    biliary colic  -pt initially scheduled for elective lap cholecystectomy today but postponed due to elevated BP  -NPO after midnight, pt may possible go tomorrow if BP stabilizes    anxiety  -continue xanax PRN  -ambien PRN for insomnia    migraines  -pt typically takes excedrin at home for migraines, cont with tylenol PRN   70 yo F with PMHx of HTN, anxiety, migraines, ovarian ca s/p hysterectomy, biliary colic presented to [facility] for elective cholecystectomy, found to have severely elevated blood pressure, medicine consulted for blood pressure management.    sudden accelerated HTN/hypertensive urgency  -pt attributes to anxiety, has a longstanding hx of HTN that has been stable on atenolol 25 mg daily  -continue atenolol 25 mg daily  -s/p hydralazine 10 mg start hydralazine 25 mg TID  -monitor routine hemodynamics    biliary colic  -pt initially scheduled for elective lap cholecystectomy today but postponed due to elevated BP  -NPO after midnight, pt may possible go tomorrow if BP stabilizes    anxiety  -continue xanax PRN  -ambien PRN for insomnia    migraines  -pt typically takes excedrin at home for migraines, cont with tylenol PRN    DVT ppx: as per surgical team

## 2022-04-20 NOTE — CONSULT NOTE ADULT - ATTENDING COMMENTS
70 yo F with PMHx of HTN, anxiety, migraines, ovarian ca s/p hysterectomy, biliary colic presented to [facility] for elective cholecystectomy, found to have severely elevated blood pressure, medicine consulted for blood pressure management.    HPI as above.     T(C): 36.5 (04-20-22 @ 19:28), Max: 36.8 (04-20-22 @ 18:28)  HR: 64 (04-20-22 @ 19:28) (56 - 68)  BP: 186/93 (04-20-22 @ 19:28) (168/63 - 215/90)  RR: 20 (04-20-22 @ 19:28) (10 - 20)  SpO2: 96% (04-20-22 @ 19:28) (96% - 99%)  Wt(kg): --    Physical Exam:   GENERAL: well-groomed, well-developed, NAD  HEENT: head NC/AT; EOM intact, PERRLA, conjunctiva & sclera clear; hearing grossly intact, moist mucous membranes  NECK: supple, no JVD  RESPIRATORY: CTA B/L, no wheezing, rales, rhonchi or rubs  CARDIOVASCULAR: S1&S2, RRR, no murmurs or gallops  ABDOMEN: soft, non-tender, non-distended, + Bowel sounds x4 quadrants, no guarding, rebound or rigidity  MUSCULOSKELETAL:  no clubbing, cyanosis or edema of all 4 extremities  LYMPH: no cervical lymphadenopathy  VASCULAR: Radial pulses 2+ bilaterally, no varicose veins   SKIN: warm and dry, color normal  NEUROLOGIC: AA&O X3, CN2-12 intact w/ no focal deficits, no sensory loss, motor Strength 5/5 in UE & LE B/L  Psych: Normal mood and affect, normal behavior      Plan:    HTN Urgency: BP improved.   -continue Atenolol and hydralazine  -goal BP reduction 25% in first 24 hours.     remainder as above.     dvt ppx: As per surgical team.

## 2022-04-21 ENCOUNTER — TRANSCRIPTION ENCOUNTER (OUTPATIENT)
Age: 72
End: 2022-04-21

## 2022-04-21 VITALS — DIASTOLIC BLOOD PRESSURE: 90 MMHG | SYSTOLIC BLOOD PRESSURE: 178 MMHG

## 2022-04-21 LAB
ANION GAP SERPL CALC-SCNC: 9 MMOL/L — SIGNIFICANT CHANGE UP (ref 5–17)
APTT BLD: 29.2 SEC — SIGNIFICANT CHANGE UP (ref 27.5–35.5)
BUN SERPL-MCNC: 24 MG/DL — HIGH (ref 7–23)
CALCIUM SERPL-MCNC: 9.5 MG/DL — SIGNIFICANT CHANGE UP (ref 8.5–10.1)
CHLORIDE SERPL-SCNC: 106 MMOL/L — SIGNIFICANT CHANGE UP (ref 96–108)
CO2 SERPL-SCNC: 25 MMOL/L — SIGNIFICANT CHANGE UP (ref 22–31)
CREAT SERPL-MCNC: 1.1 MG/DL — SIGNIFICANT CHANGE UP (ref 0.5–1.3)
EGFR: 54 ML/MIN/1.73M2 — LOW
GLUCOSE SERPL-MCNC: 97 MG/DL — SIGNIFICANT CHANGE UP (ref 70–99)
INR BLD: 0.98 RATIO — SIGNIFICANT CHANGE UP (ref 0.88–1.16)
POTASSIUM SERPL-MCNC: 4 MMOL/L — SIGNIFICANT CHANGE UP (ref 3.5–5.3)
POTASSIUM SERPL-SCNC: 4 MMOL/L — SIGNIFICANT CHANGE UP (ref 3.5–5.3)
PROTHROM AB SERPL-ACNC: 11.5 SEC — SIGNIFICANT CHANGE UP (ref 10.5–13.4)
SODIUM SERPL-SCNC: 140 MMOL/L — SIGNIFICANT CHANGE UP (ref 135–145)

## 2022-04-21 PROCEDURE — 36415 COLL VENOUS BLD VENIPUNCTURE: CPT

## 2022-04-21 PROCEDURE — 85610 PROTHROMBIN TIME: CPT

## 2022-04-21 PROCEDURE — 99233 SBSQ HOSP IP/OBS HIGH 50: CPT

## 2022-04-21 PROCEDURE — 80048 BASIC METABOLIC PNL TOTAL CA: CPT

## 2022-04-21 PROCEDURE — 85730 THROMBOPLASTIN TIME PARTIAL: CPT

## 2022-04-21 PROCEDURE — 99232 SBSQ HOSP IP/OBS MODERATE 35: CPT

## 2022-04-21 PROCEDURE — 49320 DIAG LAPARO SEPARATE PROC: CPT | Mod: 80

## 2022-04-21 RX ORDER — HYDRALAZINE HCL 50 MG
25 TABLET ORAL EVERY 6 HOURS
Refills: 0 | Status: DISCONTINUED | OUTPATIENT
Start: 2022-04-21 | End: 2022-04-21

## 2022-04-21 RX ORDER — ACETAMINOPHEN 500 MG
2 TABLET ORAL
Qty: 0 | Refills: 0 | DISCHARGE

## 2022-04-21 RX ORDER — HYDROMORPHONE HYDROCHLORIDE 2 MG/ML
0.5 INJECTION INTRAMUSCULAR; INTRAVENOUS; SUBCUTANEOUS
Refills: 0 | Status: DISCONTINUED | OUTPATIENT
Start: 2022-04-21 | End: 2022-04-21

## 2022-04-21 RX ORDER — HYDRALAZINE HCL 50 MG
10 TABLET ORAL ONCE
Refills: 0 | Status: COMPLETED | OUTPATIENT
Start: 2022-04-21 | End: 2022-04-21

## 2022-04-21 RX ORDER — VALSARTAN 80 MG/1
160 TABLET ORAL DAILY
Refills: 0 | Status: DISCONTINUED | OUTPATIENT
Start: 2022-04-21 | End: 2022-04-21

## 2022-04-21 RX ORDER — OXYCODONE HYDROCHLORIDE 5 MG/1
1 TABLET ORAL
Qty: 5 | Refills: 0
Start: 2022-04-21

## 2022-04-21 RX ORDER — IBUPROFEN 200 MG
1 TABLET ORAL
Qty: 0 | Refills: 0 | DISCHARGE

## 2022-04-21 RX ORDER — ACETAMINOPHEN 500 MG
1000 TABLET ORAL ONCE
Refills: 0 | Status: DISCONTINUED | OUTPATIENT
Start: 2022-04-21 | End: 2022-04-21

## 2022-04-21 RX ORDER — VALSARTAN 80 MG/1
1 TABLET ORAL
Qty: 0 | Refills: 0 | DISCHARGE
Start: 2022-04-21

## 2022-04-21 RX ORDER — HYDRALAZINE HCL 50 MG
1 TABLET ORAL
Qty: 0 | Refills: 0 | DISCHARGE
Start: 2022-04-21

## 2022-04-21 RX ORDER — ONDANSETRON 8 MG/1
4 TABLET, FILM COATED ORAL ONCE
Refills: 0 | Status: DISCONTINUED | OUTPATIENT
Start: 2022-04-21 | End: 2022-04-21

## 2022-04-21 RX ORDER — SODIUM CHLORIDE 9 MG/ML
1000 INJECTION, SOLUTION INTRAVENOUS
Refills: 0 | Status: DISCONTINUED | OUTPATIENT
Start: 2022-04-21 | End: 2022-04-21

## 2022-04-21 RX ADMIN — ATENOLOL 25 MILLIGRAM(S): 25 TABLET ORAL at 05:49

## 2022-04-21 RX ADMIN — Medication 1 TABLET(S): at 01:37

## 2022-04-21 RX ADMIN — Medication 10 MILLIGRAM(S): at 01:25

## 2022-04-21 RX ADMIN — Medication 0.25 MILLIGRAM(S): at 08:44

## 2022-04-21 RX ADMIN — Medication 25 MILLIGRAM(S): at 05:49

## 2022-04-21 RX ADMIN — SODIUM CHLORIDE 75 MILLILITER(S): 9 INJECTION, SOLUTION INTRAVENOUS at 12:17

## 2022-04-21 RX ADMIN — Medication 25 MILLIGRAM(S): at 15:03

## 2022-04-21 NOTE — DISCHARGE NOTE PROVIDER - NSDCFUADDINST_GEN_ALL_CORE_FT
You may shower in 24 hours.  Do not remove wound glue.  Do not let water hit wounds directly, do not rub incisions.      No heavy lifting (nothing heavier than 5 lbs.), no strenuous physical activity, no exercise.      You may perform your usual daily tasks as tolerated.      Tylenol Extra-Strength over the counter 500mg pills - take 2 every six hours regularly for the first 4-5 days after surgery, then as needed for pain.       Motrin 600mg pills - take 1 pill every 6 hours regularly for the first 4-5 days after surgery (take with food), then as needed for pain.     Take Oxycodone as prescribed if you still have pain despite the Tylenol and Motrin combination.      All 3 medications can be taken together. Take Miralax while taking oxycodone.    Follow-up with Dr. Najera in his office next week - call office for appointment (see below).      Follow-up with Dr. Gonzales (Cardiology) or outpatient cardiologist for further work-up. Call office to make appointment. Take medications as prescribed until follow-up.

## 2022-04-21 NOTE — DISCHARGE NOTE PROVIDER - HOSPITAL COURSE
HPI PST: 71 year old female PMH HTN, Palpitations, MVP, Hypercholesterolemia, Migraines, Ovarian Cancer (2012), Seasonal Allergies,  Chronic Cholecystitis; presents today for PST prior to laparoscopic Cholecystectomy - Possible open with Dr Daryl Najera on 4/20/2022. Pt notes "I have had a couple of gallbladder attacks that I thought was a stomach virus; one in May 2021 then Dec 2021, then January 2022, then last week." pt notes pain would start in the back and radiate to the front and get nauseous with vomiting with episodes lasting 5-6 hours.  Pt notes her PCP Dr Marshall sent her for a sonogram which showed stones Pt was then seen by her GI doctor Dr Shaw and from there was sent for surgical evaluation with Dr Najera. Following evaluation, exam and discussions regarding treatment options pt is electing for scheduled procedure.     Patient's outpatient scheduled laparoscopic cholecystectomy cancelled to due to elevated blood pressure in pre-op holding. Patient admitted to surgical service for overnight monitoring.     Hospital course:  Patient underwent successful _____ without complications, was sent to PACU then to the floor for monitoring.      Over then next 5 days patient was continued on antibiotics, given pain control. Diet was advanced appropriately until return of normal GI function was obtained as evidence by passing of flatus and BM in addition to toleration of diet. Lab values were monitored with resolution of elevated Wc    Disposition: Patient to follow-up with  as outpatient in 1 week.             HPI PST: 71 year old female PMH HTN, Palpitations, MVP, Hypercholesterolemia, Migraines, Ovarian Cancer (2012), Seasonal Allergies,  Chronic Cholecystitis; presents today for PST prior to laparoscopic Cholecystectomy - Possible open with Dr Daryl Najera on 4/20/2022. Pt notes "I have had a couple of gallbladder attacks that I thought was a stomach virus; one in May 2021 then Dec 2021, then January 2022, then last week." pt notes pain would start in the back and radiate to the front and get nauseous with vomiting with episodes lasting 5-6 hours.  Pt notes her PCP Dr Marshall sent her for a sonogram which showed stones Pt was then seen by her GI doctor Dr Shaw and from there was sent for surgical evaluation with Dr Najera. Following evaluation, exam and discussions regarding treatment options pt is electing for scheduled procedure.     Hospital course:  Patient's outpatient scheduled laparoscopic cholecystectomy cancelled to due to elevated blood pressure in pre-op holding. Patient admitted to surgical service for overnight monitoring. Cardiology and medicine consulted for hypertensive management and OR clearance. Vital signs closely monitored.     Patient underwent successful laparoscopic cholecystectomy without complications on Hospital Day #1, was sent to PACU then to the floor for monitoring.      Throughout hospital stay, was continued on antibiotics, given pain control. Diet was advanced appropriately until return of normal GI function was obtained as evidence by passing of flatus and BM in addition to toleration of diet. Labs and vital signs were monitored.     Disposition: Patient to follow-up with Dr. Najera as outpatient in 1 week.             HPI PST: 71 year old female PMH HTN, Palpitations, MVP, Hypercholesterolemia, Migraines, Ovarian Cancer (2012), Seasonal Allergies,  Chronic Cholecystitis; presents today for PST prior to laparoscopic Cholecystectomy - Possible open with Dr Daryl Najera on 4/20/2022. Pt notes "I have had a couple of gallbladder attacks that I thought was a stomach virus; one in May 2021 then Dec 2021, then January 2022, then last week." pt notes pain would start in the back and radiate to the front and get nauseous with vomiting with episodes lasting 5-6 hours.  Pt notes her PCP Dr Marshall sent her for a sonogram which showed stones Pt was then seen by her GI doctor Dr Shaw and from there was sent for surgical evaluation with Dr Najera. Following evaluation, exam and discussions regarding treatment options pt is electing for scheduled procedure.     Hospital course:  Patient's outpatient scheduled laparoscopic cholecystectomy cancelled to due to elevated blood pressure in pre-op holding. Patient admitted to surgical service for overnight monitoring. Cardiology and medicine consulted for hypertensive management and OR clearance. Vital signs closely monitored.     Patient underwent diagnostic laparoscopy without cholecystectomy due to prohibitive intra-abdominal adhesions Hospital Day #1, was sent to PACU then to the floor for monitoring.    Patient cleared for discharge after tolerating diet, pain well controlled and cleared per cardiology once blood pressure stabilized.     Disposition: Patient to follow-up with Dr. Najera as outpatient in 1 week.

## 2022-04-21 NOTE — BRIEF OPERATIVE NOTE - NSICDXBRIEFPOSTOP_GEN_ALL_CORE_FT
POST-OP DIAGNOSIS:  Cholelithiasis with cholecystitis 21-Apr-2022 11:42:07  Gray Francisco  Intestinal adhesions 21-Apr-2022 11:42:48  Gray Francisco

## 2022-04-21 NOTE — DISCHARGE NOTE PROVIDER - CARE PROVIDERS DIRECT ADDRESSES
[email or web address]

## 2022-04-21 NOTE — DISCHARGE NOTE PROVIDER - CARE PROVIDER_API CALL
Daryl Najera)  Surgery  [address] [place] [address]  Phone: [phone]  Fax: [phone]  Follow Up Time:    Daryl Najera)  Surgery  [address] [place] [address]  Phone: [phone]  Fax: [phone]  Follow Up Time:     Alfa Gonzales)  Internal Medicine  [address] [place] [address]  Phone: [phone]  Fax: [phone]  Follow Up Time:

## 2022-04-21 NOTE — DISCHARGE NOTE PROVIDER - PROVIDER TOKENS
PROVIDER:[TOKEN:[[id number]]] PROVIDER:[TOKEN:[[id number]]],PROVIDER:[TOKEN:[[id number]]]

## 2022-04-21 NOTE — DISCHARGE NOTE NURSING/CASE MANAGEMENT/SOCIAL WORK - NSDCPEFALRISK_GEN_ALL_CORE
For information on Fall & Injury Prevention, visit: [email or web address] OR  [email or web address] OR  https://www.cdc.gov/steadi/patient.html

## 2022-04-21 NOTE — PROVIDER CONTACT NOTE (OTHER) - SITUATION
Patient /87, HR 84. Patient also complaining of headache 8/10, stating that she suffers with migraines. Pt stated that she takes excedrin at home for relief.

## 2022-04-21 NOTE — PROGRESS NOTE ADULT - SUBJECTIVE AND OBJECTIVE BOX
[facility] -- Usha Mckeon, Andrea Zimmer, Christian Higgins Savella, Goodger: Office # [phone]    Follow Up:  HTN urgency    Subjective/Observations: Patient seen and examined. Patient awake, alert, resting in bed. No complaints of chest pain, dyspnea, palpitations or dizziness. No signs of orthopnea or PND. Tolerating room air. IVF @ 75. + migraine headaches     REVIEW OF SYSTEMS: All other review of systems are negative unless indicated above    PAST MEDICAL & SURGICAL HISTORY:  Ovarian malignant neoplasm  DX 2012    H/O: HTN (hypertension)    MVP (mitral valve prolapse)    Sciatica    Shoulder joint pain  right shoulder pain    Peritoneal carcinomatosis    Nephrolithiasis    Hypercholesterolemia  Currently monitoring with diet modifications    Hay fever    Seasonal allergies    Migraines    Chronic cholecystitis    H/O colonoscopy    S/P left oophorectomy    S/P right oophorectomy    S/P ROSALIA (total abdominal hysterectomy)    History of colon resection    MEDICATIONS  (STANDING):  ATENolol  Tablet 25 milliGRAM(s) Oral daily  ceFAZolin   IVPB 2000 milliGRAM(s) IV Intermittent once  hydrALAZINE 25 milliGRAM(s) Oral every 6 hours  lactated ringers. 1000 milliLiter(s) (75 mL/Hr) IV Continuous <Continuous>  lactated ringers. 1000 milliLiter(s) (75 mL/Hr) IV Continuous <Continuous>  valsartan 160 milliGRAM(s) Oral daily    MEDICATIONS  (PRN):  ALPRAZolam 0.25 milliGRAM(s) Oral daily PRN ANXIETY  ondansetron Injectable 4 milliGRAM(s) IV Push every 6 hours PRN Nausea  zolpidem 5 milliGRAM(s) Oral at bedtime PRN Insomnia    Allergies  adhesives (Other)  dairy products (Other)  SEASONAL ALLERGIES - Post Nasal Drip (Other)  Sulfur (Rash)      Vital Signs Last 24 Hrs  T(C): 36.9 (21 Apr 2022 05:10), Max: 36.9 (21 Apr 2022 05:10)  T(F): 98.5 (21 Apr 2022 05:10), Max: 98.5 (21 Apr 2022 05:10)  HR: 68 (21 Apr 2022 08:53) (56 - 90)  BP: 170/80 (21 Apr 2022 08:53) (154/74 - 215/90)  BP(mean): --  RR: 18 (21 Apr 2022 05:10) (10 - 20)  SpO2: 97% (21 Apr 2022 05:10) (96% - 99%)  I&O's Summary    20 Apr 2022 07:01  -  21 Apr 2022 07:00  --------------------------------------------------------  IN: 390 mL / OUT: 0 mL / NET: 390 mL      Weight (kg): 66.2 (04-20 @ 15:12)    TELE: SR 60-70s   PHYSICAL EXAM:  Constitutional: NAD, awake and alert, Well developed   HEENT: Moist Mucous Membranes, Anicteric  Pulmonary: Non-labored, breath sounds are clear bilaterally, No wheezing, rales or rhonchi  Cardiovascular: Regular, S1 and S2, No murmurs, rubs, gallops or clicks  Gastrointestinal:  soft, nontender, nondistended   Lymph: No peripheral edema. No lymphadenopathy.   Skin: No visible rashes or ulcers.  Psych:  Mood & affect appropriate      LABS: All Labs Reviewed:                        14.4   6.19  )-----------( 245      ( 18 Apr 2022 15:54 )             42.4     21 Apr 2022 08:04    140    |  106    |  24     ----------------------------<  97     4.0     |  25     |  1.10   18 Apr 2022 15:54    142    |  107    |  17     ----------------------------<  104    4.0     |  30     |  1.00     Ca    9.5        21 Apr 2022 08:04  Ca    9.8        18 Apr 2022 15:54    TPro  7.5    /  Alb  3.6    /  TBili  0.6    /  DBili  x      /  AST  20     /  ALT  79     /  AlkPhos  128    18 Apr 2022 15:54       12 Lead ECG:   Ventricular Rate 62 BPM    Atrial Rate 62 BPM    P-R Interval 168 ms    QRS Duration 96 ms    Q-T Interval 404 ms    QTC Calculation(Bazett) 410 ms    P Axis 49 degrees    R Axis -3 degrees    T Axis 19 degrees    Diagnosis Line Normal sinus rhythm  Normal ECG  No previous ECGs available  Confirmed by Cachorro Zimmer MD ([id number]) on 4/19/2022 10:20:23 AM (04-18-22 @ 15:29)    
Patient is a 71y old  Female who presents with a chief complaint of HTN Urgency (20 Apr 2022 17:30)      INTERVAL HPI/OVERNIGHT EVENTS: Patient seen and examined at bedside. Denies any complaints     MEDICATIONS  (STANDING):  ATENolol  Tablet 25 milliGRAM(s) Oral daily  ceFAZolin   IVPB 2000 milliGRAM(s) IV Intermittent once  hydrALAZINE 25 milliGRAM(s) Oral every 6 hours  lactated ringers. 1000 milliLiter(s) (75 mL/Hr) IV Continuous <Continuous>  lactated ringers. 1000 milliLiter(s) (75 mL/Hr) IV Continuous <Continuous>    MEDICATIONS  (PRN):  ALPRAZolam 0.25 milliGRAM(s) Oral daily PRN ANXIETY  ondansetron Injectable 4 milliGRAM(s) IV Push every 6 hours PRN Nausea  zolpidem 5 milliGRAM(s) Oral at bedtime PRN Insomnia      Allergies    adhesives (Other)  dairy products (Other)  SEASONAL ALLERGIES - Post Nasal Drip (Other)  Sulfur (Rash)    Intolerances        REVIEW OF SYSTEMS:  CONSTITUTIONAL: denies fever, chills, fatigue, weakness  HEENT: denies blurred vision, sore throat  SKIN: denies new lesions, rash  CARDIOVASCULAR: denies chest pain, chest pressure, palpitations  RESPIRATORY: denies shortness of breath, sputum production  GASTROINTESTINAL: denies nausea, vomiting, diarrhea, abdominal pain  GENITOURINARY: denies dysuria, discharge  NEUROLOGICAL: denies numbness, headache, focal weakness  MUSCULOSKELETAL: denies new joint pain, muscle aches  HEMATOLOGIC: denies gross bleeding, bruising  LYMPHATICS: denies enlarged lymph nodes, extremity swelling  PSYCHIATRIC: denies recent changes in anxiety, depression  ENDOCRINOLOGIC: denies sweating, cold or heat intolerance  Vital Signs Last 24 Hrs  T(C): 36.9 (21 Apr 2022 05:10), Max: 36.9 (21 Apr 2022 05:10)  T(F): 98.5 (21 Apr 2022 05:10), Max: 98.5 (21 Apr 2022 05:10)  HR: 90 (21 Apr 2022 05:10) (56 - 90)  BP: 155/72 (21 Apr 2022 05:10) (154/74 - 215/90)  BP(mean): --  RR: 18 (21 Apr 2022 05:10) (10 - 20)  SpO2: 97% (21 Apr 2022 05:10) (96% - 99%)    PHYSICAL EXAM:  GENERAL: NAD, well-groomed, well-developed  HEAD:  Atraumatic, Normocephalic  EYES: EOMI, PERRL, conjunctiva and sclera clear  ENMT: No tonsillar erythema, exudates, or enlargement; Moist mucous membranes  NECK: Supple, No JVD, Normal thyroid  NERVOUS SYSTEM:  Alert & Oriented X3, Moves all extremities, Sensation to light touch intact  CHEST/LUNG: Clear to auscultation bilaterally; No wheezes, rales or rhonchi  HEART: RRR, S1, S2; No murmurs, rubs, or gallops  ABDOMEN: Soft, Nontender, Nondistended; Bowel sounds present  EXTREMITIES:  2+ Peripheral Pulses, No clubbing, cyanosis, or edema  LYMPH: No lymphadenopathy noted  SKIN: No rashes or lesions  LABS:            CAPILLARY BLOOD GLUCOSE        BLOOD CULTURE    RADIOLOGY & ADDITIONAL TESTS:    Imaging Personally Reviewed:  [ ] YES     Consultant(s) Notes Reviewed:      Care Discussed with Consultants/Other Providers:
71 yr F with HTN on atenolol for 25+ years, hx of peritoneal carcinoma, migraines and anxiety scheduled for lap dmitriy. Atenolol and xanax taken in morning however, systolics repeatedly over 200s. Patient also with headache, describes as tension like by eyes, but not her usual migraine. Patient given versed in holding room due to her anxiety with no resolve in her bp. She states she was supposed ot follow up with cardiology but missed the appointment due to the surgery. To be admitted to have bp controlled prior to gallbladder removed. 
SUBJECTIVE:  Patient seen and examined at bedside. Patient scheduled for elective lap dmitriy yesterday, however, cancelled in holding room due to HTN. Patient feeling well this AM, BP has come down as per flowsheets. Med and cardio to see this AM for possible clearance.     Vital Signs Last 24 Hrs  T(C): 36.9 (21 Apr 2022 05:10), Max: 36.9 (21 Apr 2022 05:10)  T(F): 98.5 (21 Apr 2022 05:10), Max: 98.5 (21 Apr 2022 05:10)  HR: 90 (21 Apr 2022 05:10) (56 - 90)  BP: 155/72 (21 Apr 2022 05:10) (154/74 - 215/90)  BP(mean): --  RR: 18 (21 Apr 2022 05:10) (10 - 20)  SpO2: 97% (21 Apr 2022 05:10) (96% - 99%)    PHYSICAL EXAM:  GENERAL: NAD, resting comfortably in bed   HEAD:  Atraumatic, Normocephalic  ABDOMEN: Soft, ND, NT, +BS  EXT: No calf tenderness b/l.   NEUROLOGY: A&O x 3

## 2022-04-21 NOTE — PROGRESS NOTE ADULT - ASSESSMENT
71 year old female with PMH HTN (on atenolol at home x 25 years) peritoneal carcinoma, migraines, and anxiety, presented today for scheduled lap dmitriy, with HTN urgency systolics in 200's intra op.     HTN urgency/ pre-op   - Patient presented for lap dmitriy which was postponed 2/2 HTN urgency to 200/ 90's in PACU.   - S/p Hydralazine 10 mg IV x 1 now on hydralazine 25 mg Po Q6   - BP improved to 150-170s.   - Started valsartan 160 mg PO daily this morning   - Continue Atenolol    - EKG: NSR HR 62, no ischemic changes noted (from PST), repeat EKG, no anginal complaints   - CXR: no acute evidence of active chest disease  - Tele with ST 60-70s, no events, can d/c tele   - Patient euvolemic on examination with no overt signs of heart failure or cardiac ischemia.   - No severe valvular abnormalities noted on examination   - Pt has no active ischemia, decompensated heart failure, unstable arrythmia, or severe stenotic valvular disease. Patient is optimized from cardiovascular standpoint to proceed with planned procedure with routine hemodynamic monitoring.     - Monitor and replete lytes, keep K>4, Mg>2.  - Will continue to follow.    Eva Patten, MS FNP, [facility]  Nurse Practitioner- Cardiology   Spectra #[phone]

## 2022-04-21 NOTE — DISCHARGE NOTE PROVIDER - NSDCMRMEDTOKEN_GEN_ALL_CORE_FT
Ambien 10 mg oral tablet: 1 tab(s) orally once a day (at bedtime), As Needed  atenolol 25 mg oral tablet: 1 tab(s) orally once a day  Excedrin oral tablet: 1 tab(s) orally prn  Tylenol 325 mg oral tablet: 2 tab(s) orally every 4 hours  Xanax 0.25 mg oral tablet:  orally once a day, As Needed   Ambien 10 mg oral tablet: 1 tab(s) orally once a day (at bedtime), As Needed  atenolol 25 mg oral tablet: 1 tab(s) orally once a day  hydrALAZINE 25 mg oral tablet: 1 tab(s) orally every 6 hours  Motrin 600 mg oral tablet: 1 tab(s) orally every 6 hours as needed for mild to moderate pain. Take with food  oxyCODONE 5 mg oral tablet: 1 tab(s) orally every 6 hours, As Needed -for severe pain MDD:4. Take miralax while taking.  Tylenol Extra Strength 500 mg oral tablet: 2 tab(s) orally every 6 hours as needed for mild to moderate pain. Take with food  valsartan 160 mg oral tablet: 1 tab(s) orally once a day  Xanax 0.25 mg oral tablet:  orally once a day, As Needed

## 2022-04-21 NOTE — PROGRESS NOTE ADULT - ASSESSMENT
72 yo F with PMHx of HTN, anxiety, migraines, ovarian ca s/p hysterectomy, biliary colic presented to [facility] for elective cholecystectomy, found to have severely elevated blood pressure, medicine consulted for blood pressure management.     accelerated HTN/hypertensive urgency  -pt attributes to anxiety, has a longstanding hx of HTN that has been stable on atenolol 25 mg daily. She reports blood pressure have been elevated for past couple weeks.   -continue atenolol 25 mg daily  -continue hydralazine 25 mg, change to QID with hold parameters   -monitor routine hemodynamics. Should be ok to proceed with surgery as long blood pressure in 130-140/ 80 range.     biliary colic  -pt initially scheduled for elective lap cholecystectomy today but postponed due to elevated BP  -NPO. Gentle hydration while NPO    anxiety  -continue xanax PRN  -ambien PRN for insomnia    migraines  -pt typically takes excedrin at home for migraines, cont with tylenol PRN    DVT ppx: as per surgical team   70 yo F with PMHx of HTN, anxiety, migraines, ovarian ca s/p hysterectomy, biliary colic presented to [facility] for elective cholecystectomy, found to have severely elevated blood pressure, medicine consulted for blood pressure management.     accelerated HTN/hypertensive urgency  -pt attributes to anxiety, has a longstanding hx of HTN that has been stable on atenolol 25 mg daily. She reports blood pressure have been elevated for past couple weeks.   -continue atenolol 25 mg daily  -continue hydralazine 25 mg, change to QID with hold parameters   -monitor routine hemodynamics.     biliary colic  -pt initially scheduled for elective lap cholecystectomy today but postponed due to elevated BP  -NPO. Gentle hydration while NPO    anxiety  -continue xanax PRN  -ambien PRN for insomnia    migraines  -pt typically takes excedrin at home for migraines, cont with tylenol PRN    DVT ppx: as per surgical team

## 2022-04-21 NOTE — PROGRESS NOTE ADULT - ASSESSMENT
70 y/o F here for elective lap dmitriy, cancelled yesterday due to HTN    PLAN:  Recheck vitals, continue HTN medications   Med and cardio to see for clearance this AM  Possible lap dmitriy later today pending clearances and optimization  Will discuss with Dr. Najera

## 2022-04-21 NOTE — DISCHARGE NOTE PROVIDER - NSDCCPCAREPLAN_GEN_ALL_CORE_FT
PRINCIPAL DISCHARGE DIAGNOSIS  Diagnosis: Cholecystitis with cholelithiasis  Assessment and Plan of Treatment:

## 2022-04-21 NOTE — DISCHARGE NOTE NURSING/CASE MANAGEMENT/SOCIAL WORK - PATIENT PORTAL LINK FT
You can access the FollowMyHealth Patient Portal offered by [facility] by registering at the following website: [email or web address]. By joining [facility]’s FollowMyHealth portal, you will also be able to view your health information using other applications (apps) compatible with our system.

## 2022-04-22 LAB
ENDOMYSIUM IGA SER QL: NEGATIVE
ENDOMYSIUM IGA TITR SER: NORMAL

## 2022-04-25 PROBLEM — G43.909 MIGRAINE, UNSPECIFIED, NOT INTRACTABLE, WITHOUT STATUS MIGRAINOSUS: Chronic | Status: ACTIVE | Noted: 2022-04-18

## 2022-04-25 PROBLEM — K81.1 CHRONIC CHOLECYSTITIS: Chronic | Status: ACTIVE | Noted: 2022-04-18

## 2022-04-25 PROBLEM — J30.2 OTHER SEASONAL ALLERGIC RHINITIS: Chronic | Status: ACTIVE | Noted: 2022-04-18

## 2022-04-25 PROBLEM — J30.1 ALLERGIC RHINITIS DUE TO POLLEN: Chronic | Status: ACTIVE | Noted: 2022-04-18

## 2022-04-26 ENCOUNTER — APPOINTMENT (OUTPATIENT)
Dept: SURGERY | Facility: CLINIC | Age: 72
End: 2022-04-26
Payer: MEDICARE

## 2022-04-26 PROCEDURE — 99024 POSTOP FOLLOW-UP VISIT: CPT

## 2022-05-04 ENCOUNTER — OUTPATIENT (OUTPATIENT)
Dept: OUTPATIENT SERVICES | Facility: HOSPITAL | Age: 72
LOS: 1 days | End: 2022-05-04
Payer: MEDICARE

## 2022-05-04 VITALS
SYSTOLIC BLOOD PRESSURE: 148 MMHG | WEIGHT: 143.08 LBS | DIASTOLIC BLOOD PRESSURE: 81 MMHG | OXYGEN SATURATION: 99 % | HEART RATE: 74 BPM | RESPIRATION RATE: 16 BRPM | TEMPERATURE: 97 F

## 2022-05-04 DIAGNOSIS — K80.30 CALCULUS OF BILE DUCT WITH CHOLANGITIS, UNSPECIFIED, WITHOUT OBSTRUCTION: ICD-10-CM

## 2022-05-04 DIAGNOSIS — Z01.818 ENCOUNTER FOR OTHER PREPROCEDURAL EXAMINATION: ICD-10-CM

## 2022-05-04 LAB
ALBUMIN SERPL ELPH-MCNC: 3.5 G/DL — SIGNIFICANT CHANGE UP (ref 3.3–5)
ALP SERPL-CCNC: 94 U/L — SIGNIFICANT CHANGE UP (ref 40–120)
ALT FLD-CCNC: 46 U/L — SIGNIFICANT CHANGE UP (ref 12–78)
ANION GAP SERPL CALC-SCNC: 5 MMOL/L — SIGNIFICANT CHANGE UP (ref 5–17)
AST SERPL-CCNC: 16 U/L — SIGNIFICANT CHANGE UP (ref 15–37)
BILIRUB SERPL-MCNC: 0.8 MG/DL — SIGNIFICANT CHANGE UP (ref 0.2–1.2)
BUN SERPL-MCNC: 23 MG/DL — SIGNIFICANT CHANGE UP (ref 7–23)
CALCIUM SERPL-MCNC: 10 MG/DL — SIGNIFICANT CHANGE UP (ref 8.5–10.1)
CHLORIDE SERPL-SCNC: 104 MMOL/L — SIGNIFICANT CHANGE UP (ref 96–108)
CO2 SERPL-SCNC: 30 MMOL/L — SIGNIFICANT CHANGE UP (ref 22–31)
CREAT SERPL-MCNC: 1.2 MG/DL — SIGNIFICANT CHANGE UP (ref 0.5–1.3)
EGFR: 49 ML/MIN/1.73M2 — LOW
GLUCOSE SERPL-MCNC: 114 MG/DL — HIGH (ref 70–99)
POTASSIUM SERPL-MCNC: 4.9 MMOL/L — SIGNIFICANT CHANGE UP (ref 3.5–5.3)
POTASSIUM SERPL-SCNC: 4.9 MMOL/L — SIGNIFICANT CHANGE UP (ref 3.5–5.3)
PROT SERPL-MCNC: 7.1 G/DL — SIGNIFICANT CHANGE UP (ref 6–8.3)
SODIUM SERPL-SCNC: 139 MMOL/L — SIGNIFICANT CHANGE UP (ref 135–145)

## 2022-05-04 PROCEDURE — G0463: CPT

## 2022-05-04 PROCEDURE — 36415 COLL VENOUS BLD VENIPUNCTURE: CPT

## 2022-05-04 PROCEDURE — 80053 COMPREHEN METABOLIC PANEL: CPT

## 2022-05-04 RX ORDER — IBUPROFEN 200 MG
1 TABLET ORAL
Qty: 0 | Refills: 0 | DISCHARGE

## 2022-05-04 RX ORDER — ACETAMINOPHEN 500 MG
2 TABLET ORAL
Qty: 0 | Refills: 0 | DISCHARGE

## 2022-05-04 NOTE — H&P PST ADULT - LYMPHATIC
posterior cervical L/posterior cervical R/anterior cervical L/anterior cervical R posterior cervical L/posterior cervical R/anterior cervical L/anterior cervical R/supraclavicular L/supraclavicular R

## 2022-05-04 NOTE — H&P PST ADULT - PROBLEM SELECTOR PLAN 2
Cardiac clearance needed since pt had elevated BP in the holding room on 4/20/22 and had to be admitted overnight. Comprehensive panel ordered. CBC and EKG ordered. Pre-op instructions given and pt verbalized understanding. Pt will make the appt for the COVID19 PCR testing at a [facility] testing site 3 days before surgery.

## 2022-05-04 NOTE — H&P PST ADULT - HISTORY OF PRESENT ILLNESS
71 year old female PMH HTN, Palpitations, MVP, Hypercholesterolemia, Migraines, Ovarian Cancer (2012), Seasonal Allergies,  Chronic Cholecystitis; presents today for PST prior to laparoscopic Cholecystectomy - Possible open with Dr Daryl Najera on 4/20/2022. Pt notes "I have had a couple of gallbladder attacks that I thought was a stomach virus; one in May 2021 then Dec 2021, then January 2022, then last week." pt notes pain would start in the back and radiate to the front and get nauseous with vomiting with episodes lasting 5-6 hours.  Pt notes her PCP Dr Marshall sent her for a sonogram which showed stones Pt was then seen by her GI doctor Dr Shaw and from there was sent for surgical evaluation with Dr Najera. Following evaluation, exam and discussions regarding treatment options pt is electing for scheduled procedure.  71 year old female PMH HTN, Palpitations, MVP, Hypercholesterolemia, Migraines, Ovarian Cancer (2012), Seasonal Allergies,  Chronic Cholecystitis; presents today for PST prior to laparoscopic Cholecystectomy - Possible open with Dr Daryl Najera on 4/20/2022. Pt notes "I have had a couple of gallbladder attacks that I thought was a stomach virus; one in May 2021 then Dec 2021, then January 2022, then last week." pt notes pain would start in the back and radiate to the front and get nauseous with vomiting with episodes lasting 5-6 hours.  Pt notes her PCP Dr Marshall sent her for a sonogram which showed stones. Pt was then seen by her GI doctor Dr Shaw and from there was sent for surgical evaluation with Dr Najera. Following evaluation, exam and discussions regarding treatment options pt is electing for scheduled procedure.     ADDENDUM: Pt's history reviewed as above. Pt states laparoscopic cholecystectomy "could not be done because I have too much scar tissue". Pt was then referred to Dr. Muro for ERCP. Pt still with occasional abdominal pain. Pt states "last gallbladder attack was on 4/29 after eating tacos". Pt denies current n/v/d and abdominal pain today. Pt electing for ERCP on 5/9/2022.

## 2022-05-04 NOTE — H&P PST ADULT - NSICDXPASTSURGICALHX_GEN_ALL_CORE_FT
PAST SURGICAL HISTORY:  H/O colonoscopy     History of colon resection     S/P left oophorectomy     S/P right oophorectomy     S/P ROSALIA (total abdominal hysterectomy)      PAST SURGICAL HISTORY:  H/O colonoscopy     History of colon resection (2012)    S/P left oophorectomy     S/P right oophorectomy     S/P ROSALIA (total abdominal hysterectomy) (2012)

## 2022-05-04 NOTE — H&P PST ADULT - CARDIOVASCULAR SYMPTOMS
Paperwork signed and faxed.    
[facility] order received via fax. Form in your mailbox to be signed.    
palpitations

## 2022-05-04 NOTE — H&P PST ADULT - NEGATIVE CARDIOVASCULAR SYMPTOMS
no chest pain/no dyspnea on exertion no chest pain/no dyspnea on exertion/no orthopnea/no paroxysmal nocturnal dyspnea/no peripheral edema/no claudication

## 2022-05-04 NOTE — H&P PST ADULT - LAST STRESS TEST
Notes had stress test 5 years ago - currently does not see cardiologist but had seen Dr Santoro 2018 - no ischemia

## 2022-05-04 NOTE — H&P PST ADULT - NSICDXPASTMEDICALHX_GEN_ALL_CORE_FT
PAST MEDICAL HISTORY:  Chronic cholecystitis     H/O: HTN (hypertension)     Hay fever     Hypercholesterolemia Currently monitoring with diet modifications    Migraines     MVP (mitral valve prolapse)     Nephrolithiasis     Ovarian malignant neoplasm DX 2012    Peritoneal carcinomatosis     Sciatica     Seasonal allergies     Shoulder joint pain right shoulder pain     PAST MEDICAL HISTORY:  Calculus of bile duct with cholangitis, unspecified, without obstruction     Chronic cholecystitis     H/O: HTN (hypertension)     Hay fever     Hypercholesterolemia Currently monitoring with diet modifications    Migraines     MVP (mitral valve prolapse)     Ovarian malignant neoplasm DX 2012    Peritoneal carcinomatosis     Sciatica     Seasonal allergies     Shoulder joint pain right shoulder pain     PAST MEDICAL HISTORY:  Calculus of bile duct with cholangitis, unspecified, without obstruction     Chronic cholecystitis     H/O: HTN (hypertension)     Hay fever     Hypercholesterolemia     Migraines     MVP (mitral valve prolapse)     Ovarian malignant neoplasm DX 2012    Peritoneal carcinomatosis     Sciatica     Seasonal allergies     Shoulder joint pain right shoulder pain

## 2022-05-04 NOTE — H&P PST ADULT - CARDIOVASCULAR COMMENTS
Pt notes H/O intermittent palpitations "especially when I am nervous" Pt notes H/O intermittent palpitations "especially when I am nervous", Pt seen by cardiologist on 4/28 for follow up after having high BP in the holding room before surgery. No new anti-hypertensive medicine was started.

## 2022-05-04 NOTE — H&P PST ADULT - MUSCULOSKELETAL
negative ROM intact/no joint warmth/no calf tenderness detailed exam No joint pain, swelling or deformity; no limitation of movement

## 2022-05-04 NOTE — H&P PST ADULT - ACTIVITY
Walks dog for 2 miles daily - also does treadmill and weights daily Pt can walk up 2 flights of stairs and walk 4 blocks without difficulty

## 2022-05-04 NOTE — H&P PST ADULT - PROBLEM SELECTOR PLAN 1
PST Labs; CBC, CMP, Amylase, Lipase, Type & Screen, COVID-19 PCR swab, EKG. Medical clearance scheduled with PCP Dr Toi Marshall on 4/19/2022. Pt instructed to stop any NSAIDS/Herbal Supplements between now and procedure. May take Tylenol if needed for pain between now and procedure. Morning of procedure she may take her Atenolol with small sip of water as well as Xanax (if needed). Pre-op instructions as well as pre-op wash instructions given to pt with understanding verbalized. All questions addressed with pt prior to her leaving the PST department. ERCP on 5/9/2022.

## 2022-05-05 PROBLEM — K80.30: Chronic | Status: ACTIVE | Noted: 2022-05-04

## 2022-05-05 PROBLEM — E78.00 PURE HYPERCHOLESTEROLEMIA, UNSPECIFIED: Chronic | Status: ACTIVE | Noted: 2022-04-18

## 2022-05-06 ENCOUNTER — INPATIENT (INPATIENT)
Facility: HOSPITAL | Age: 72
LOS: 2 days | Discharge: HOME | DRG: 439 | End: 2022-05-09
Attending: HOSPITALIST | Admitting: STUDENT IN AN ORGANIZED HEALTH CARE EDUCATION/TRAINING PROGRAM
Payer: MEDICARE

## 2022-05-06 ENCOUNTER — OUTPATIENT (OUTPATIENT)
Dept: OUTPATIENT SERVICES | Facility: HOSPITAL | Age: 72
LOS: 1 days | End: 2022-05-06
Payer: MEDICARE

## 2022-05-06 VITALS
HEIGHT: 72 IN | WEIGHT: 145.06 LBS | HEART RATE: 67 BPM | DIASTOLIC BLOOD PRESSURE: 86 MMHG | OXYGEN SATURATION: 97 % | SYSTOLIC BLOOD PRESSURE: 180 MMHG | TEMPERATURE: 97 F | RESPIRATION RATE: 96 BRPM

## 2022-05-06 DIAGNOSIS — Z20.828 CONTACT WITH AND (SUSPECTED) EXPOSURE TO OTHER VIRAL COMMUNICABLE DISEASES: ICD-10-CM

## 2022-05-06 LAB
ALBUMIN SERPL ELPH-MCNC: 3.9 G/DL — SIGNIFICANT CHANGE UP (ref 3.3–5)
ALP SERPL-CCNC: 168 U/L — HIGH (ref 40–120)
ALT FLD-CCNC: 177 U/L — HIGH (ref 12–78)
ANION GAP SERPL CALC-SCNC: 7 MMOL/L — SIGNIFICANT CHANGE UP (ref 5–17)
APPEARANCE UR: ABNORMAL
AST SERPL-CCNC: 262 U/L — HIGH (ref 15–37)
BACTERIA # UR AUTO: ABNORMAL
BASOPHILS # BLD AUTO: 0.03 K/UL — SIGNIFICANT CHANGE UP (ref 0–0.2)
BASOPHILS NFR BLD AUTO: 0.3 % — SIGNIFICANT CHANGE UP (ref 0–2)
BILIRUB SERPL-MCNC: 0.9 MG/DL — SIGNIFICANT CHANGE UP (ref 0.2–1.2)
BILIRUB UR-MCNC: NEGATIVE — SIGNIFICANT CHANGE UP
BUN SERPL-MCNC: 27 MG/DL — HIGH (ref 7–23)
CALCIUM SERPL-MCNC: 10 MG/DL — SIGNIFICANT CHANGE UP (ref 8.5–10.1)
CHLORIDE SERPL-SCNC: 105 MMOL/L — SIGNIFICANT CHANGE UP (ref 96–108)
CO2 SERPL-SCNC: 28 MMOL/L — SIGNIFICANT CHANGE UP (ref 22–31)
COD CRY URNS QL: ABNORMAL
COLOR SPEC: YELLOW — SIGNIFICANT CHANGE UP
CREAT SERPL-MCNC: 1.2 MG/DL — SIGNIFICANT CHANGE UP (ref 0.5–1.3)
DIFF PNL FLD: ABNORMAL
EGFR: 49 ML/MIN/1.73M2 — LOW
EOSINOPHIL # BLD AUTO: 0.06 K/UL — SIGNIFICANT CHANGE UP (ref 0–0.5)
EOSINOPHIL NFR BLD AUTO: 0.5 % — SIGNIFICANT CHANGE UP (ref 0–6)
EPI CELLS # UR: SIGNIFICANT CHANGE UP
GLUCOSE SERPL-MCNC: 136 MG/DL — HIGH (ref 70–99)
GLUCOSE UR QL: NEGATIVE — SIGNIFICANT CHANGE UP
HCT VFR BLD CALC: 44.8 % — SIGNIFICANT CHANGE UP (ref 34.5–45)
HGB BLD-MCNC: 14.6 G/DL — SIGNIFICANT CHANGE UP (ref 11.5–15.5)
IMM GRANULOCYTES NFR BLD AUTO: 0.4 % — SIGNIFICANT CHANGE UP (ref 0–1.5)
KETONES UR-MCNC: ABNORMAL
LACTATE SERPL-SCNC: 1 MMOL/L — SIGNIFICANT CHANGE UP (ref 0.7–2)
LEUKOCYTE ESTERASE UR-ACNC: ABNORMAL
LIDOCAIN IGE QN: HIGH U/L (ref 73–393)
LYMPHOCYTES # BLD AUTO: 1.85 K/UL — SIGNIFICANT CHANGE UP (ref 1–3.3)
LYMPHOCYTES # BLD AUTO: 16.3 % — SIGNIFICANT CHANGE UP (ref 13–44)
MCHC RBC-ENTMCNC: 30.5 PG — SIGNIFICANT CHANGE UP (ref 27–34)
MCHC RBC-ENTMCNC: 32.6 GM/DL — SIGNIFICANT CHANGE UP (ref 32–36)
MCV RBC AUTO: 93.5 FL — SIGNIFICANT CHANGE UP (ref 80–100)
MONOCYTES # BLD AUTO: 1.06 K/UL — HIGH (ref 0–0.9)
MONOCYTES NFR BLD AUTO: 9.3 % — SIGNIFICANT CHANGE UP (ref 2–14)
NEUTROPHILS # BLD AUTO: 8.32 K/UL — HIGH (ref 1.8–7.4)
NEUTROPHILS NFR BLD AUTO: 73.2 % — SIGNIFICANT CHANGE UP (ref 43–77)
NITRITE UR-MCNC: NEGATIVE — SIGNIFICANT CHANGE UP
NRBC # BLD: 0 /100 WBCS — SIGNIFICANT CHANGE UP (ref 0–0)
PH UR: 5 — SIGNIFICANT CHANGE UP (ref 5–8)
PLATELET # BLD AUTO: 223 K/UL — SIGNIFICANT CHANGE UP (ref 150–400)
POTASSIUM SERPL-MCNC: 4 MMOL/L — SIGNIFICANT CHANGE UP (ref 3.5–5.3)
POTASSIUM SERPL-SCNC: 4 MMOL/L — SIGNIFICANT CHANGE UP (ref 3.5–5.3)
PROT SERPL-MCNC: 7.6 G/DL — SIGNIFICANT CHANGE UP (ref 6–8.3)
PROT UR-MCNC: 30 MG/DL
RBC # BLD: 4.79 M/UL — SIGNIFICANT CHANGE UP (ref 3.8–5.2)
RBC # FLD: 12.2 % — SIGNIFICANT CHANGE UP (ref 10.3–14.5)
RBC CASTS # UR COMP ASSIST: ABNORMAL /HPF (ref 0–4)
SARS-COV-2 RNA SPEC QL NAA+PROBE: SIGNIFICANT CHANGE UP
SARS-COV-2 RNA SPEC QL NAA+PROBE: SIGNIFICANT CHANGE UP
SODIUM SERPL-SCNC: 140 MMOL/L — SIGNIFICANT CHANGE UP (ref 135–145)
SP GR SPEC: 1.02 — SIGNIFICANT CHANGE UP (ref 1.01–1.02)
UROBILINOGEN FLD QL: NEGATIVE — SIGNIFICANT CHANGE UP
WBC # BLD: 11.36 K/UL — HIGH (ref 3.8–10.5)
WBC # FLD AUTO: 11.36 K/UL — HIGH (ref 3.8–10.5)
WBC UR QL: SIGNIFICANT CHANGE UP

## 2022-05-06 PROCEDURE — U0005: CPT

## 2022-05-06 PROCEDURE — 71045 X-RAY EXAM CHEST 1 VIEW: CPT | Mod: 26

## 2022-05-06 PROCEDURE — 99285 EMERGENCY DEPT VISIT HI MDM: CPT

## 2022-05-06 PROCEDURE — 74176 CT ABD & PELVIS W/O CONTRAST: CPT | Mod: 26,MA

## 2022-05-06 PROCEDURE — U0003: CPT

## 2022-05-06 RX ORDER — SODIUM CHLORIDE 9 MG/ML
1000 INJECTION INTRAMUSCULAR; INTRAVENOUS; SUBCUTANEOUS ONCE
Refills: 0 | Status: COMPLETED | OUTPATIENT
Start: 2022-05-06 | End: 2022-05-06

## 2022-05-06 RX ORDER — ONDANSETRON 8 MG/1
4 TABLET, FILM COATED ORAL ONCE
Refills: 0 | Status: COMPLETED | OUTPATIENT
Start: 2022-05-06 | End: 2022-05-06

## 2022-05-06 RX ORDER — HYDROMORPHONE HYDROCHLORIDE 2 MG/ML
0.5 INJECTION INTRAMUSCULAR; INTRAVENOUS; SUBCUTANEOUS ONCE
Refills: 0 | Status: DISCONTINUED | OUTPATIENT
Start: 2022-05-06 | End: 2022-05-06

## 2022-05-06 RX ADMIN — SODIUM CHLORIDE 1000 MILLILITER(S): 9 INJECTION INTRAMUSCULAR; INTRAVENOUS; SUBCUTANEOUS at 20:40

## 2022-05-06 RX ADMIN — ONDANSETRON 4 MILLIGRAM(S): 8 TABLET, FILM COATED ORAL at 20:40

## 2022-05-06 RX ADMIN — HYDROMORPHONE HYDROCHLORIDE 0.5 MILLIGRAM(S): 2 INJECTION INTRAMUSCULAR; INTRAVENOUS; SUBCUTANEOUS at 23:38

## 2022-05-06 RX ADMIN — HYDROMORPHONE HYDROCHLORIDE 0.5 MILLIGRAM(S): 2 INJECTION INTRAMUSCULAR; INTRAVENOUS; SUBCUTANEOUS at 23:58

## 2022-05-06 RX ADMIN — SODIUM CHLORIDE 1000 MILLILITER(S): 9 INJECTION INTRAMUSCULAR; INTRAVENOUS; SUBCUTANEOUS at 23:37

## 2022-05-06 RX ADMIN — HYDROMORPHONE HYDROCHLORIDE 0.5 MILLIGRAM(S): 2 INJECTION INTRAMUSCULAR; INTRAVENOUS; SUBCUTANEOUS at 20:40

## 2022-05-06 NOTE — ED ADULT TRIAGE NOTE - CHIEF COMPLAINT QUOTE
I am coming in for my gallbladder pain.  I am supposed to surgery on Monday (here) , but I can't deal w/this pain.  I have been vomiting all day and I was told that I cannot take any medication because of the upcoming surgery on Monday. I am coming in for my gallbladder pain.  I am supposed to have surgery on Monday (here) , but I can't deal w/this pain.  I have been vomiting all day and I was told that I cannot take any medication because of the upcoming surgery on Monday. (actively heaving in triage)

## 2022-05-06 NOTE — ED PROVIDER NOTE - CLINICAL SUMMARY MEDICAL DECISION MAKING FREE TEXT BOX
72 yo f who underwent recent failed lap ccy surgery by dr rosa due to adhesions from prior surgeries.  she is scheduled for ercp monday and developed sudden onset of severe worsening of pain in abd cw gb illness the pain is 10/10 in nature  accompanied by  for evaluation  plan manage pain consult with gi   ct ro abscess perforation or infection  labs iv fluids xray ekg ua covid testing and dispo accordingly

## 2022-05-06 NOTE — ED PROVIDER NOTE - GASTROINTESTINAL, MLM
pt has multiple recent healing trocar scars that appar uninfected, she has a well healed midline vertical incision and diffuse abd pain mostly in epigastrium no cvat

## 2022-05-06 NOTE — ED ADULT NURSE NOTE - CAS EDP DISCH DISPOSITION ADMI
Problem: Communication  Goal: The ability to communicate needs accurately and effectively will improve  Outcome: PROGRESSING AS EXPECTED  Note: Pt communicates needs effectively and calls for assistance appropriately.     Problem: Post Op Day 1 CABG/Heart Valve Replacement  Goal: Optimal care of the post op CABG/heart valve replacement Post Op Day 1  Outcome: PROGRESSING AS EXPECTED  Intervention: Daily Weights  Note: Daily weights performed  Intervention: Up in chair for all meals  Note: Pt up to chair for meals  Intervention: Ambulate in am if stable. First ambulation is 25 feet. Repeat x 3 as tolerated.  Ambulate again before bed.  Note: Pt ambulating as tolerated, to goal of 4 times per day.  Intervention: IS q 1 hour while awake and record best IS volume  Note: Pt using IS as tolerated  Intervention: Graduated elastic stockings  Note: TRINITY hose in use  Intervention: Saline lock IV  Note: IV saline locked  Intervention: Transfer to tele status, begin VS q 4 hours  Note: Pt tele status, VS Q4 hours      [facility]

## 2022-05-06 NOTE — ED PROVIDER NOTE - NSICDXPASTMEDICALHX_GEN_ALL_CORE_FT
PAST MEDICAL HISTORY:  Calculus of bile duct with cholangitis, unspecified, without obstruction     Chronic cholecystitis     H/O: HTN (hypertension)     Hay fever     Hypercholesterolemia     Migraines     MVP (mitral valve prolapse)     Ovarian malignant neoplasm DX 2012    Peritoneal carcinomatosis     Sciatica     Seasonal allergies     Shoulder joint pain right shoulder pain

## 2022-05-06 NOTE — ED PROVIDER NOTE - CONSTITUTIONAL, MLM
normal... Well appearing, awake, alert, oriented to person, place, time/situation and appears very uncomfortable holding a pillow to her stomach sitting up pale

## 2022-05-06 NOTE — ED ADULT NURSE NOTE - CHIEF COMPLAINT QUOTE
I am coming in for my gallbladder pain.  I am supposed to have surgery on Monday (here) , but I can't deal w/this pain.  I have been vomiting all day and I was told that I cannot take any medication because of the upcoming surgery on Monday. (actively heaving in triage)

## 2022-05-06 NOTE — ED PROVIDER NOTE - PROGRESS NOTE DETAILS
dr leslie paged dr anuj mcgill dw dr birmingham, agrees with plan sugg call surgery for consideration of admission dr Damico on call for dr Reinaldo mcgill dw surgery, await imaging recc admit to med since pt to have ercp monday, will do in pt rather than out pt and surgical team will see pt vanessa. dw surgery, await imaging recc admit to med since pt to have ercp monday, will do in pt rather than out pt and surgical team will see pt tonight. seen by surgery results reveal severe pancreatitis dr birmingham aware recc admit to medicine, pending ct seen by surgery results reveal severe pancreatitis dr birmingham aware recc admit to medicine, pending ct  pt still has some discomfort, copies of labs provided and reviewed at bedside pt aware of diagnosis and need for admission pending ct scan was seen at bedside by surgical pa  given pain will treat with same meds pt was comfortable with that dose and route.

## 2022-05-06 NOTE — ED PROVIDER NOTE - NEUROLOGICAL, MLM
Letter by Ryann Hodge DO at      Author: Ryann Hodge DO Service: -- Author Type: --    Filed:  Encounter Date: 2021 Status: (Other)         Crista Harman  [address] [facility] [id number]      2021      Dear Crista Harman,   : 1935      This letter is in regards to the appointment that you had scheduled on 2021 at the [facility] with Dr. Hodge.     The [facility] strives to see all patients in a timely manner and we need your help to achieve this.  The above-mentioned appointment was missed and we do not have record of a cancellation by you.  Whenever possible, we request appointment cancellations at least 72 hours in advance.  This time allows us to offer the appointment to another patient in need.      If you feel you have received this letter in error, or if you need to reschedule this appointment, please call our office so that we may update our records.      Sincerely,    [facility]            Alert and oriented, no focal deficits, no motor or sensory deficits.

## 2022-05-06 NOTE — ED PROVIDER NOTE - NSICDXPASTSURGICALHX_GEN_ALL_CORE_FT
PAST SURGICAL HISTORY:  H/O colonoscopy     History of colon resection (2012)    S/P left oophorectomy     S/P right oophorectomy     S/P ROSALIA (total abdominal hysterectomy) (2012)

## 2022-05-06 NOTE — ED ADULT NURSE NOTE - OBJECTIVE STATEMENT
70 y/o female received from triage. Alert and oriented x4. C/o 9/10 upper quadrant abdominal pain. Abdomen soft, tender to upper quadrant, non distended. Pt states she is suppose to have procedure for gallbladder on Monday. Pt with n/v. Denies any cp, sob, diarrhea, headache, fever/chills at this time. Respirations even and unlabored. Denies urinary symptoms or dark stool. Will continue to monitor.

## 2022-05-06 NOTE — ED PROVIDER NOTE - NSICDXFAMILYHX_GEN_ALL_CORE_FT
FAMILY HISTORY:  Father  Still living? No  Family history of myocardial infarction at age less than 60, Age at diagnosis: 51-60    Mother  Still living? No  Family history of colon cancer, Age at diagnosis: 61-70    
3

## 2022-05-06 NOTE — ED PROVIDER NOTE - CARE PLAN
1 Principal Discharge DX:	Continuous severe abdominal pain   Principal Discharge DX:	Continuous severe abdominal pain  Secondary Diagnosis:	Acute pancreatitis

## 2022-05-07 DIAGNOSIS — E78.5 HYPERLIPIDEMIA, UNSPECIFIED: ICD-10-CM

## 2022-05-07 DIAGNOSIS — K85.90 ACUTE PANCREATITIS WITHOUT NECROSIS OR INFECTION, UNSPECIFIED: ICD-10-CM

## 2022-05-07 DIAGNOSIS — R10.9 UNSPECIFIED ABDOMINAL PAIN: ICD-10-CM

## 2022-05-07 DIAGNOSIS — I10 ESSENTIAL (PRIMARY) HYPERTENSION: ICD-10-CM

## 2022-05-07 DIAGNOSIS — F41.9 ANXIETY DISORDER, UNSPECIFIED: ICD-10-CM

## 2022-05-07 DIAGNOSIS — R74.01 ELEVATION OF LEVELS OF LIVER TRANSAMINASE LEVELS: ICD-10-CM

## 2022-05-07 DIAGNOSIS — K81.9 CHOLECYSTITIS, UNSPECIFIED: ICD-10-CM

## 2022-05-07 DIAGNOSIS — G47.00 INSOMNIA, UNSPECIFIED: ICD-10-CM

## 2022-05-07 DIAGNOSIS — Z29.9 ENCOUNTER FOR PROPHYLACTIC MEASURES, UNSPECIFIED: ICD-10-CM

## 2022-05-07 DIAGNOSIS — K85.10 BILIARY ACUTE PANCREATITIS WITHOUT NECROSIS OR INFECTION: ICD-10-CM

## 2022-05-07 LAB
ALBUMIN SERPL ELPH-MCNC: 2.7 G/DL — LOW (ref 3.3–5)
ALP SERPL-CCNC: 129 U/L — HIGH (ref 40–120)
ALT FLD-CCNC: 110 U/L — HIGH (ref 12–78)
ANION GAP SERPL CALC-SCNC: 4 MMOL/L — LOW (ref 5–17)
AST SERPL-CCNC: 77 U/L — HIGH (ref 15–37)
BASOPHILS # BLD AUTO: 0.02 K/UL — SIGNIFICANT CHANGE UP (ref 0–0.2)
BASOPHILS NFR BLD AUTO: 0.2 % — SIGNIFICANT CHANGE UP (ref 0–2)
BILIRUB SERPL-MCNC: 1 MG/DL — SIGNIFICANT CHANGE UP (ref 0.2–1.2)
BUN SERPL-MCNC: 21 MG/DL — SIGNIFICANT CHANGE UP (ref 7–23)
CALCIUM SERPL-MCNC: 8.5 MG/DL — SIGNIFICANT CHANGE UP (ref 8.5–10.1)
CHLORIDE SERPL-SCNC: 111 MMOL/L — HIGH (ref 96–108)
CO2 SERPL-SCNC: 27 MMOL/L — SIGNIFICANT CHANGE UP (ref 22–31)
CREAT SERPL-MCNC: 1 MG/DL — SIGNIFICANT CHANGE UP (ref 0.5–1.3)
EGFR: 61 ML/MIN/1.73M2 — SIGNIFICANT CHANGE UP
EOSINOPHIL # BLD AUTO: 0.03 K/UL — SIGNIFICANT CHANGE UP (ref 0–0.5)
EOSINOPHIL NFR BLD AUTO: 0.3 % — SIGNIFICANT CHANGE UP (ref 0–6)
GLUCOSE SERPL-MCNC: 104 MG/DL — HIGH (ref 70–99)
HCT VFR BLD CALC: 43.3 % — SIGNIFICANT CHANGE UP (ref 34.5–45)
HCV AB S/CO SERPL IA: 0.07 S/CO — SIGNIFICANT CHANGE UP (ref 0–0.99)
HCV AB SERPL-IMP: SIGNIFICANT CHANGE UP
HGB BLD-MCNC: 14.3 G/DL — SIGNIFICANT CHANGE UP (ref 11.5–15.5)
IMM GRANULOCYTES NFR BLD AUTO: 0.2 % — SIGNIFICANT CHANGE UP (ref 0–1.5)
LYMPHOCYTES # BLD AUTO: 1.31 K/UL — SIGNIFICANT CHANGE UP (ref 1–3.3)
LYMPHOCYTES # BLD AUTO: 15 % — SIGNIFICANT CHANGE UP (ref 13–44)
MCHC RBC-ENTMCNC: 30.9 PG — SIGNIFICANT CHANGE UP (ref 27–34)
MCHC RBC-ENTMCNC: 33 GM/DL — SIGNIFICANT CHANGE UP (ref 32–36)
MCV RBC AUTO: 93.5 FL — SIGNIFICANT CHANGE UP (ref 80–100)
MONOCYTES # BLD AUTO: 0.69 K/UL — SIGNIFICANT CHANGE UP (ref 0–0.9)
MONOCYTES NFR BLD AUTO: 7.9 % — SIGNIFICANT CHANGE UP (ref 2–14)
NEUTROPHILS # BLD AUTO: 6.69 K/UL — SIGNIFICANT CHANGE UP (ref 1.8–7.4)
NEUTROPHILS NFR BLD AUTO: 76.4 % — SIGNIFICANT CHANGE UP (ref 43–77)
NRBC # BLD: 0 /100 WBCS — SIGNIFICANT CHANGE UP (ref 0–0)
PLATELET # BLD AUTO: 202 K/UL — SIGNIFICANT CHANGE UP (ref 150–400)
POTASSIUM SERPL-MCNC: 4.5 MMOL/L — SIGNIFICANT CHANGE UP (ref 3.5–5.3)
POTASSIUM SERPL-SCNC: 4.5 MMOL/L — SIGNIFICANT CHANGE UP (ref 3.5–5.3)
PROT SERPL-MCNC: 5.9 G/DL — LOW (ref 6–8.3)
RBC # BLD: 4.63 M/UL — SIGNIFICANT CHANGE UP (ref 3.8–5.2)
RBC # FLD: 12.3 % — SIGNIFICANT CHANGE UP (ref 10.3–14.5)
SODIUM SERPL-SCNC: 142 MMOL/L — SIGNIFICANT CHANGE UP (ref 135–145)
WBC # BLD: 8.76 K/UL — SIGNIFICANT CHANGE UP (ref 3.8–10.5)
WBC # FLD AUTO: 8.76 K/UL — SIGNIFICANT CHANGE UP (ref 3.8–10.5)

## 2022-05-07 PROCEDURE — 99223 1ST HOSP IP/OBS HIGH 75: CPT | Mod: GC

## 2022-05-07 PROCEDURE — 99223 1ST HOSP IP/OBS HIGH 75: CPT

## 2022-05-07 PROCEDURE — 93010 ELECTROCARDIOGRAM REPORT: CPT

## 2022-05-07 RX ORDER — HEPARIN SODIUM 5000 [USP'U]/ML
5000 INJECTION INTRAVENOUS; SUBCUTANEOUS ONCE
Refills: 0 | Status: COMPLETED | OUTPATIENT
Start: 2022-05-07 | End: 2022-05-07

## 2022-05-07 RX ORDER — HYDROMORPHONE HYDROCHLORIDE 2 MG/ML
0.25 INJECTION INTRAMUSCULAR; INTRAVENOUS; SUBCUTANEOUS EVERY 6 HOURS
Refills: 0 | Status: DISCONTINUED | OUTPATIENT
Start: 2022-05-07 | End: 2022-05-09

## 2022-05-07 RX ORDER — ZOLPIDEM TARTRATE 10 MG/1
5 TABLET ORAL AT BEDTIME
Refills: 0 | Status: DISCONTINUED | OUTPATIENT
Start: 2022-05-07 | End: 2022-05-09

## 2022-05-07 RX ORDER — ALPRAZOLAM 0.25 MG
1 TABLET ORAL
Qty: 0 | Refills: 0 | DISCHARGE

## 2022-05-07 RX ORDER — HYDROMORPHONE HYDROCHLORIDE 2 MG/ML
0.5 INJECTION INTRAMUSCULAR; INTRAVENOUS; SUBCUTANEOUS EVERY 6 HOURS
Refills: 0 | Status: DISCONTINUED | OUTPATIENT
Start: 2022-05-07 | End: 2022-05-09

## 2022-05-07 RX ORDER — SODIUM CHLORIDE 9 MG/ML
1000 INJECTION INTRAMUSCULAR; INTRAVENOUS; SUBCUTANEOUS
Refills: 0 | Status: DISCONTINUED | OUTPATIENT
Start: 2022-05-07 | End: 2022-05-07

## 2022-05-07 RX ORDER — ROSUVASTATIN CALCIUM 5 MG/1
0 TABLET ORAL
Qty: 0 | Refills: 5 | DISCHARGE

## 2022-05-07 RX ORDER — SODIUM CHLORIDE 9 MG/ML
1000 INJECTION, SOLUTION INTRAVENOUS
Refills: 0 | Status: DISCONTINUED | OUTPATIENT
Start: 2022-05-07 | End: 2022-05-09

## 2022-05-07 RX ORDER — ALPRAZOLAM 0.25 MG
0.25 TABLET ORAL THREE TIMES A DAY
Refills: 0 | Status: DISCONTINUED | OUTPATIENT
Start: 2022-05-07 | End: 2022-05-09

## 2022-05-07 RX ORDER — HYDRALAZINE HCL 50 MG
10 TABLET ORAL EVERY 6 HOURS
Refills: 0 | Status: DISCONTINUED | OUTPATIENT
Start: 2022-05-07 | End: 2022-05-07

## 2022-05-07 RX ORDER — HYDRALAZINE HCL 50 MG
25 TABLET ORAL EVERY 8 HOURS
Refills: 0 | Status: DISCONTINUED | OUTPATIENT
Start: 2022-05-07 | End: 2022-05-08

## 2022-05-07 RX ORDER — ONDANSETRON 8 MG/1
4 TABLET, FILM COATED ORAL EVERY 8 HOURS
Refills: 0 | Status: DISCONTINUED | OUTPATIENT
Start: 2022-05-07 | End: 2022-05-09

## 2022-05-07 RX ORDER — PIPERACILLIN AND TAZOBACTAM 4; .5 G/20ML; G/20ML
3.38 INJECTION, POWDER, LYOPHILIZED, FOR SOLUTION INTRAVENOUS ONCE
Refills: 0 | Status: COMPLETED | OUTPATIENT
Start: 2022-05-07 | End: 2022-05-07

## 2022-05-07 RX ORDER — ATENOLOL 25 MG/1
25 TABLET ORAL DAILY
Refills: 0 | Status: DISCONTINUED | OUTPATIENT
Start: 2022-05-07 | End: 2022-05-09

## 2022-05-07 RX ADMIN — Medication 0.25 MILLIGRAM(S): at 22:04

## 2022-05-07 RX ADMIN — Medication 25 MILLIGRAM(S): at 10:37

## 2022-05-07 RX ADMIN — SODIUM CHLORIDE 160 MILLILITER(S): 9 INJECTION INTRAMUSCULAR; INTRAVENOUS; SUBCUTANEOUS at 08:59

## 2022-05-07 RX ADMIN — ONDANSETRON 4 MILLIGRAM(S): 8 TABLET, FILM COATED ORAL at 05:32

## 2022-05-07 RX ADMIN — HEPARIN SODIUM 5000 UNIT(S): 5000 INJECTION INTRAVENOUS; SUBCUTANEOUS at 01:56

## 2022-05-07 RX ADMIN — HYDROMORPHONE HYDROCHLORIDE 0.25 MILLIGRAM(S): 2 INJECTION INTRAMUSCULAR; INTRAVENOUS; SUBCUTANEOUS at 02:10

## 2022-05-07 RX ADMIN — ONDANSETRON 4 MILLIGRAM(S): 8 TABLET, FILM COATED ORAL at 14:20

## 2022-05-07 RX ADMIN — HYDROMORPHONE HYDROCHLORIDE 0.5 MILLIGRAM(S): 2 INJECTION INTRAMUSCULAR; INTRAVENOUS; SUBCUTANEOUS at 05:21

## 2022-05-07 RX ADMIN — PIPERACILLIN AND TAZOBACTAM 200 GRAM(S): 4; .5 INJECTION, POWDER, LYOPHILIZED, FOR SOLUTION INTRAVENOUS at 01:56

## 2022-05-07 RX ADMIN — HYDROMORPHONE HYDROCHLORIDE 0.25 MILLIGRAM(S): 2 INJECTION INTRAMUSCULAR; INTRAVENOUS; SUBCUTANEOUS at 10:37

## 2022-05-07 RX ADMIN — Medication 25 MILLIGRAM(S): at 22:04

## 2022-05-07 RX ADMIN — HYDROMORPHONE HYDROCHLORIDE 0.25 MILLIGRAM(S): 2 INJECTION INTRAMUSCULAR; INTRAVENOUS; SUBCUTANEOUS at 11:20

## 2022-05-07 RX ADMIN — ATENOLOL 25 MILLIGRAM(S): 25 TABLET ORAL at 05:33

## 2022-05-07 RX ADMIN — HYDROMORPHONE HYDROCHLORIDE 0.5 MILLIGRAM(S): 2 INJECTION INTRAMUSCULAR; INTRAVENOUS; SUBCUTANEOUS at 05:33

## 2022-05-07 RX ADMIN — SODIUM CHLORIDE 1000 MILLILITER(S): 9 INJECTION INTRAMUSCULAR; INTRAVENOUS; SUBCUTANEOUS at 02:00

## 2022-05-07 RX ADMIN — SODIUM CHLORIDE 100 MILLILITER(S): 9 INJECTION, SOLUTION INTRAVENOUS at 12:39

## 2022-05-07 RX ADMIN — SODIUM CHLORIDE 160 MILLILITER(S): 9 INJECTION INTRAMUSCULAR; INTRAVENOUS; SUBCUTANEOUS at 01:56

## 2022-05-07 RX ADMIN — HYDROMORPHONE HYDROCHLORIDE 0.25 MILLIGRAM(S): 2 INJECTION INTRAMUSCULAR; INTRAVENOUS; SUBCUTANEOUS at 02:30

## 2022-05-07 NOTE — H&P ADULT - PROBLEM SELECTOR PLAN 6
Will administer 1 x dose of 5000 units heparin sq for vte ppx pending GI and surgery reeval   SCDs Hold home statin in setting of transaminitis

## 2022-05-07 NOTE — H&P ADULT - PROBLEM SELECTOR PLAN 1
Pt with hx of biliary colic s/p unsuccessful lap dmitriy due to obstructional adhesions presents with severe onset epigastric pain and emesis   Labs sign for Lipase >30,0000   CT abdomen/pelvis Pt with hx of cholelithiasis and biliary colic s/p unsuccessful lap dmitriy due to obstructional adhesions presents with severe onset epigastric pain and emesis   Labs sign for Lipase >30,0000   CT abdomen/pelvis pending official read however appears to demonstrate pancreatitic mass  Admit to medicine   Keep NPO  Start IV NS @ 160 cc/hr  Dilaudid 0.25 mg q6 PRN for mod pain, diluadid 0.5 mg q6 PRN for severe  Given extensive hx and leukocytosis, will cover with zosyn x 1  Surgery consulted Dr. Damico, recs appreciated   GI consulted Dr. Bhardwaj, f/u recs   May benefit from transfer to tertiary care center pending GI input Pt with hx of cholecystitis, cholelithiasis and biliary colic s/p unsuccessful lap dmitriy due to obstructional adhesions presents with severe onset epigastric pain and emesis   Labs sign for Lipase >30,0000   CT abdomen/pelvis pending official read however appears to demonstrate pancreatitic mass  Admit to medicine   Keep NPO  Start IV NS @ 160 cc/hr  Dilaudid 0.25 mg q6 PRN for mod pain, diluadid 0.5 mg q6 PRN for severe  Given extensive hx and leukocytosis, will cover with zosyn x 1  Surgery consulted Dr. Damico, recs appreciated   GI consulted Dr. Bhardwaj, f/u recs   May benefit from transfer to tertiary care center pending GI input Pt with hx of cholecystitis, cholelithiasis and biliary colic s/p unsuccessful lap dmitriy due to obstructional adhesions presents with severe onset epigastric pain and emesis   Labs sign for Lipase >30,0000   CT abdomen/pelvis revealing acute cholecystitis, pancreatitis, duodenitis, Redemonstrated incompletely characterized opacity measuring 5 x 4.1 cm at  the level of the head/uncinate process of the pancreas. Again this may represent isolated peripancreatic necrotizing pancreatitis. However, given patient's history of peritoneal carcinomatosis and ovarian neoplasm, recurrent peritoneal carcinomatosis remains in the differential. Correlate with tumor markers.  Admit to medicine   Keep NPO  Start IV NS @ 160 cc/hr  Dilaudid 0.25 mg q6 PRN for mod pain, diluadid 0.5 mg q6 PRN for severe  Given extensive hx and leukocytosis, will cover with zosyn x 1  Surgery consulted Dr. Damico, recs appreciated   GI consulted Dr. Bhardwaj, f/u recs   May benefit from transfer to tertiary care center pending GI input

## 2022-05-07 NOTE — H&P ADULT - NSHPSOCIALHISTORY_GEN_ALL_CORE
Lives with . Ambulates independently. Social alcohol use. Denies tobacco or drug use. Pfizer vaccine x 2

## 2022-05-07 NOTE — H&P ADULT - NSHPPHYSICALEXAM_GEN_ALL_CORE
T(C): 36.6 (05-06-22 @ 23:48), Max: 36.6 (05-06-22 @ 23:48)  HR: 71 (05-06-22 @ 23:48) (67 - 71)  BP: 167/82 (05-06-22 @ 23:48) (167/82 - 180/86)  RR: 17 (05-06-22 @ 23:48) (16 - 96)  SpO2: 97% (05-06-22 @ 23:48) (97% - 97%)    GENERAL: no acute distress, appropriate, pleasant, laying in bed  EYES: sclera clear, no exudates  ENMT: oropharynx clear without erythema, no exudates, moist mucous membranes  NECK: supple, soft, no thyromegaly noted  LUNGS: good air entry bilaterally, clear to auscultation, symmetric breath sounds, no wheezing or rhonchi appreciated  HEART: soft S1/S2, regular rate and rhythm, no murmurs noted, no lower extremity edema  GASTROINTESTINAL: abdomen is soft, mild tenderness epigastric area, nondistended, normoactive bowel sounds, no palpable masses  INTEGUMENT: good skin turgor, no lesions noted  MUSCULOSKELETAL: no clubbing or cyanosis, no obvious deformity  NEUROLOGIC: awake, alert, oriented x3, good muscle tone in 4 extremities, no obvious sensory deficits  PSYCHIATRIC: mood is good, affect is congruent, linear and logical thought process  HEME/LYMPH: no palpable supraclavicular nodules, no obvious ecchymosis or petechiae T(C): 36.6 (05-06-22 @ 23:48), Max: 36.6 (05-06-22 @ 23:48)  HR: 71 (05-06-22 @ 23:48) (67 - 71)  BP: 167/82 (05-06-22 @ 23:48) (167/82 - 180/86)  RR: 17 (05-06-22 @ 23:48) (16 - 96)  SpO2: 97% (05-06-22 @ 23:48) (97% - 97%)    GENERAL: no acute distress, appropriate, pleasant, laying in bed  EYES: sclera clear, no exudates  ENMT: oropharynx clear without erythema, no exudates, moist mucous membranes  NECK: supple, soft, no thyromegaly noted  LUNGS: good air entry bilaterally, clear to auscultation, symmetric breath sounds, no wheezing or rhonchi appreciated  HEART: soft S1/S2, regular rate and rhythm, no murmurs noted, no lower extremity edema  GASTROINTESTINAL: abdomen is soft, mild tenderness epigastric area, mild abdomen distension, normoactive bowel sounds, no palpable masses  INTEGUMENT: good skin turgor, no lesions noted  MUSCULOSKELETAL: no clubbing or cyanosis, no obvious deformity  NEUROLOGIC: awake, alert, oriented x3, good muscle tone in 4 extremities, no obvious sensory deficits  PSYCHIATRIC: mood is good, affect is congruent, linear and logical thought process  HEME/LYMPH: no palpable supraclavicular nodules, no obvious ecchymosis or petechiae

## 2022-05-07 NOTE — H&P ADULT - PROBLEM SELECTOR PLAN 3
Continue home xanax PRN Chronic  BP elevated in the ED  Likely from pain   Continue home atenolol 25 mg qd   Consider increase in antihypertensives if BP remains elevated

## 2022-05-07 NOTE — H&P ADULT - PROBLEM SELECTOR PLAN 2
LFT elevated on admission   Likely from gallstone pancreatitis  Avoid hepatoxins   Check hepatic function panel   GI consult LFT elevated on admission   Likely from gallstone pancreatitis/cholecystitis   Avoid hepatotoxins   Check hepatic function panel   GI consult

## 2022-05-07 NOTE — H&P ADULT - NSHPREVIEWOFSYSTEMS_GEN_ALL_CORE
CONSTITUTIONAL: denies fever, chills, fatigue, weakness  HEENT: denies blurred vision, sore throat  SKIN: denies new lesions, rash  CARDIOVASCULAR: denies chest pain, chest pressure, palpitations  RESPIRATORY: denies shortness of breath, sputum production  GASTROINTESTINAL: admits nausea, vomiting, abdominal pain  GENITOURINARY: denies dysuria, discharge, flank pain  NEUROLOGICAL: denies numbness, headache, focal weakness  MUSCULOSKELETAL: denies new joint pain, muscle aches  HEMATOLOGIC: denies gross bleeding, bruising  LYMPHATICS: denies enlarged lymph nodes, extremity swelling  PSYCHIATRIC: denies recent changes in anxiety, depression  ENDOCRINOLOGIC: denies sweating, cold or heat intolerance

## 2022-05-07 NOTE — H&P ADULT - HISTORY OF PRESENT ILLNESS
Pt is a 70 yo F with a PMHx of HTN, HLD, anxiety, migraines, ovarian ca s/p hysterectomy, biliary colic who presents with abdominal pain and emesis. Pt says this evening at around 5 pm she felt sudden onset abdominal pain. Says she has had similar pain from prior gallbladder attacks, but not quite as severe. Said the pain was constant, located in the epigastric region and radiating to her back between her should blades. She used warm compresses for the pain without relief, which prompted her to come to the ER for further evaluation. She also states she had multiple episodes of emesis prior to arrival. In the ED, labwork revealing WBC 11.36, AST//177, Lipase >30,000. She received IV Dilaudid x2, zofran x 2 and 2 L NS boluses. At the of my examination, pt says pain is improved, now 3/10. Said nausea resolved. Denies fevers, chills, chest pain, SOB, melena or hematochezia.  Pt is a 70 yo F with a PMHx of HTN, HLD, anxiety, migraines, ovarian ca s/p hysterectomy, biliary colic who presents with abdominal pain and emesis. Pt says this evening at around 5 pm she felt sudden onset abdominal pain. Says she has had similar pain from prior gallbladder attacks, but not quite as severe. Said the pain was constant, located in the epigastric region and radiating to her back between her should blades. She used warm compresses for the pain without relief, which prompted her to come to the ER for further evaluation. She also states she had multiple episodes of emesis prior to arrival. In the ED, labwork revealing WBC 11.36, AST//177, Lipase >30,000. She received IV Dilaudid x2, zofran x 2 and 2 L NS boluses. At the of my examination, pt says pain is improved, now 3/10. Said nausea resolved. Denies fevers, chills, chest pain, SOB, melena or hematochezia. Of note, pt admitted 2 weeks ago for elective lap dmitriy due to biliary pain. Procedure was unsuccessful due to obstructive adhesions. Was scheduled for ERCP this Monday with Dr. Bhardwaj  Pt is a 70 yo F with a PMHx of HTN, HLD, anxiety, migraines, ovarian ca s/p hysterectomy, biliary colic who presents with abdominal pain and emesis. Pt says this evening at around 5 pm she felt sudden onset abdominal pain. Says she has had similar pain from prior gallbladder attacks, but not quite as severe. Said the pain was constant, located in the epigastric region and radiating to her back between her should blades. She used warm compresses for the pain without relief, which prompted her to come to the ER for further evaluation. She also states she had multiple episodes of emesis prior to arrival.     In the ED, labwork revealing WBC 11.36, AST//177, Lipase >30,000. She received IV Dilaudid x2, zofran x 2 and 2 L NS boluses. At the of my examination, pt says pain is improved, now 3/10. Said nausea resolved. Denies fevers, chills, chest pain, SOB, melena or hematochezia. Of note, pt admitted 2 weeks ago for elective lap dmitriy due to biliary pain. Procedure was unsuccessful due to obstructive adhesions. Was scheduled for ERCP this Monday with Dr. Bhardwaj

## 2022-05-07 NOTE — PROGRESS NOTE ADULT - SUBJECTIVE AND OBJECTIVE BOX
Patient is a 71y old  Female who presents with a chief complaint of Pancreatitis (07 May 2022 10:02)      INTERVAL HPI/OVERNIGHT EVENTS: Patient seen and examined at bedside. Admitted overnight. Patient reports epigastric pain is present but controlled. Feels nauseous. Denies fevers, chills, headache, lightheadedness, chest pain, dyspnea.    MEDICATIONS  (STANDING):  ATENolol  Tablet 25 milliGRAM(s) Oral daily  dextrose 5% + sodium chloride 0.9%. 1000 milliLiter(s) (100 mL/Hr) IV Continuous <Continuous>    MEDICATIONS  (PRN):  ALPRAZolam 0.25 milliGRAM(s) Oral three times a day PRN anxiety  hydrALAZINE 25 milliGRAM(s) Oral every 8 hours PRN Systolic blood pressure >160  HYDROmorphone  Injectable 0.5 milliGRAM(s) IV Push every 6 hours PRN Severe Pain (7 - 10)  HYDROmorphone  Injectable 0.25 milliGRAM(s) IV Push every 6 hours PRN Moderate Pain (4 - 6)  ondansetron Injectable 4 milliGRAM(s) IV Push every 8 hours PRN Nausea and/or Vomiting  zolpidem 5 milliGRAM(s) Oral at bedtime PRN Insomnia  zolpidem 5 milliGRAM(s) Oral at bedtime PRN Insomnia      Allergies    adhesives (Other)  dairy products (Other)  SEASONAL ALLERGIES - Post Nasal Drip (Other)  Sulfur (Rash)    Intolerances        REVIEW OF SYSTEMS:  CONSTITUTIONAL: No fever or chills  HEENT:  No headache, no sore throat  RESPIRATORY: No cough, wheezing, or shortness of breath  CARDIOVASCULAR: No chest pain, palpitations  GASTROINTESTINAL: + abd pain, +nausea, + vomiting, no diarrhea  GENITOURINARY: No dysuria, frequency, or hematuria  NEUROLOGICAL: no focal weakness or dizziness  MUSCULOSKELETAL: + radiating pain of upper back    Vital Signs Last 24 Hrs  T(C): 36.8 (07 May 2022 10:28), Max: 37.1 (07 May 2022 09:12)  T(F): 98.3 (07 May 2022 10:28), Max: 98.8 (07 May 2022 09:12)  HR: 67 (07 May 2022 10:28) (67 - 77)  BP: 169/81 (07 May 2022 10:28) (167/82 - 190/76)  BP(mean): --  RR: 17 (07 May 2022 10:28) (16 - 96)  SpO2: 99% (07 May 2022 10:28) (97% - 99%)    PHYSICAL EXAM:  GENERAL: NAD  HEENT:  anicteric, moist mucous membranes  CHEST/LUNG:  CTA b/l, no rales, wheezes, or rhonchi  HEART:  RRR, S1, S2  ABDOMEN:  BS+, soft,  nondistended, mildly TTP in epigastric region  EXTREMITIES: no edema, cyanosis, or calf tenderness  NERVOUS SYSTEM: answers questions and follows commands appropriately    LABS:                        14.3   8.76  )-----------( 202      ( 07 May 2022 08:30 )             43.3     CBC Full  -  ( 07 May 2022 08:30 )  WBC Count : 8.76 K/uL  Hemoglobin : 14.3 g/dL  Hematocrit : 43.3 %  Platelet Count - Automated : 202 K/uL  Mean Cell Volume : 93.5 fl  Mean Cell Hemoglobin : 30.9 pg  Mean Cell Hemoglobin Concentration : 33.0 gm/dL  Auto Neutrophil # : 6.69 K/uL  Auto Lymphocyte # : 1.31 K/uL  Auto Monocyte # : 0.69 K/uL  Auto Eosinophil # : 0.03 K/uL  Auto Basophil # : 0.02 K/uL  Auto Neutrophil % : 76.4 %  Auto Lymphocyte % : 15.0 %  Auto Monocyte % : 7.9 %  Auto Eosinophil % : 0.3 %  Auto Basophil % : 0.2 %    07 May 2022 08:30    142    |  111    |  21     ----------------------------<  104    4.5     |  27     |  1.00     Ca    8.5        07 May 2022 08:30    TPro  5.9    /  Alb  2.7    /  TBili  1.0    /  DBili  0.2    /  AST  77     /  ALT  110    /  AlkPhos  129    07 May 2022 08:30      Urinalysis Basic - ( 06 May 2022 21:47 )    Color: Yellow / Appearance: Slightly Turbid / S.025 / pH: x  Gluc: x / Ketone: Trace  / Bili: Negative / Urobili: Negative   Blood: x / Protein: 30 mg/dL / Nitrite: Negative   Leuk Esterase: Trace / RBC: 6-10 /HPF / WBC 3-5   Sq Epi: x / Non Sq Epi: Few / Bacteria: Few      CAPILLARY BLOOD GLUCOSE              RADIOLOGY & ADDITIONAL TESTS:    Personally reviewed.     Consultant(s) Notes Reviewed:  [x] YES  [ ] NO

## 2022-05-07 NOTE — CONSULT NOTE ADULT - PROBLEM SELECTOR RECOMMENDATION 9
- Recommend GI consult for decision regarding work-up of pancreatic mass and possible transfer to tertiary facility with hepatobiliary specialists.   - F/u official CT read  - Keep NPO, IVF  - Care as per primary team  - Case discussed with Dr. Damico (covering for Dr. Najera this weekend)

## 2022-05-07 NOTE — CONSULT NOTE ADULT - NS ATTEND AMEND GEN_ALL_CORE FT
Discussed with Consulting PA at time of above documentation and see with daytime PA in ED this morning (roughly 9am).   present at bedside.  Laboratory and Radiology findings personally reviewed and discussed with the patient and her .  Finding of inflammatory mass/changes to head of pancreas with pericholecystic fluid/edema and several small stones.  Elevated WBC, Lipase, LFTS consistent with Pancreatitis (admitting [place] quite low 2-3).  History reviewed.  Complex surgical history and recent attempt at Lap Krystal unsuccessful due to extensive adhesions.  Was planning to see Hepatobiliary/Transplant service as outpatient but never got that far.  Was scheduled for ERCP with Dr Bhardwaj this week.    Patient admitted to medical service.  Will continue supportive care and treatement for Pancreatitis.  GI to see  May benefit from transfer to tertiary care for further workup and management by Hepatobiliary team.

## 2022-05-07 NOTE — H&P ADULT - ASSESSMENT
Pt is a 70 yo F with a PMHx of HTN, HLD, anxiety, migraines, ovarian ca s/p hysterectomy, biliary colic who presents with abdominal pain and emesis. Admit for gallstone pancreatitis  Pt is a 70 yo F with a PMHx of HTN, HLD, anxiety, migraines, ovarian ca s/p hysterectomy, hx of peritoneal carcinomatosis, biliary colic who presents with abdominal pain and emesis. Admit for gallstone pancreatitis, acute dmitriy and pancreatitic mass

## 2022-05-08 ENCOUNTER — TRANSCRIPTION ENCOUNTER (OUTPATIENT)
Age: 72
End: 2022-05-08

## 2022-05-08 LAB
ALBUMIN SERPL ELPH-MCNC: 2.9 G/DL — LOW (ref 3.3–5)
ALP SERPL-CCNC: 112 U/L — SIGNIFICANT CHANGE UP (ref 40–120)
ALT FLD-CCNC: 69 U/L — SIGNIFICANT CHANGE UP (ref 12–78)
ANION GAP SERPL CALC-SCNC: 9 MMOL/L — SIGNIFICANT CHANGE UP (ref 5–17)
AST SERPL-CCNC: 31 U/L — SIGNIFICANT CHANGE UP (ref 15–37)
BASOPHILS # BLD AUTO: 0.03 K/UL — SIGNIFICANT CHANGE UP (ref 0–0.2)
BASOPHILS NFR BLD AUTO: 0.2 % — SIGNIFICANT CHANGE UP (ref 0–2)
BILIRUB SERPL-MCNC: 1.5 MG/DL — HIGH (ref 0.2–1.2)
BUN SERPL-MCNC: 11 MG/DL — SIGNIFICANT CHANGE UP (ref 7–23)
CALCIUM SERPL-MCNC: 8.9 MG/DL — SIGNIFICANT CHANGE UP (ref 8.5–10.1)
CHLORIDE SERPL-SCNC: 108 MMOL/L — SIGNIFICANT CHANGE UP (ref 96–108)
CO2 SERPL-SCNC: 23 MMOL/L — SIGNIFICANT CHANGE UP (ref 22–31)
CREAT SERPL-MCNC: 0.74 MG/DL — SIGNIFICANT CHANGE UP (ref 0.5–1.3)
CULTURE RESULTS: SIGNIFICANT CHANGE UP
EGFR: 88 ML/MIN/1.73M2 — SIGNIFICANT CHANGE UP
EOSINOPHIL # BLD AUTO: 0.09 K/UL — SIGNIFICANT CHANGE UP (ref 0–0.5)
EOSINOPHIL NFR BLD AUTO: 0.7 % — SIGNIFICANT CHANGE UP (ref 0–6)
GLUCOSE SERPL-MCNC: 111 MG/DL — HIGH (ref 70–99)
HCT VFR BLD CALC: 42.8 % — SIGNIFICANT CHANGE UP (ref 34.5–45)
HGB BLD-MCNC: 14.3 G/DL — SIGNIFICANT CHANGE UP (ref 11.5–15.5)
IMM GRANULOCYTES NFR BLD AUTO: 0.3 % — SIGNIFICANT CHANGE UP (ref 0–1.5)
LYMPHOCYTES # BLD AUTO: 1.07 K/UL — SIGNIFICANT CHANGE UP (ref 1–3.3)
LYMPHOCYTES # BLD AUTO: 8.4 % — LOW (ref 13–44)
MCHC RBC-ENTMCNC: 30.8 PG — SIGNIFICANT CHANGE UP (ref 27–34)
MCHC RBC-ENTMCNC: 33.4 GM/DL — SIGNIFICANT CHANGE UP (ref 32–36)
MCV RBC AUTO: 92 FL — SIGNIFICANT CHANGE UP (ref 80–100)
MONOCYTES # BLD AUTO: 1.37 K/UL — HIGH (ref 0–0.9)
MONOCYTES NFR BLD AUTO: 10.8 % — SIGNIFICANT CHANGE UP (ref 2–14)
NEUTROPHILS # BLD AUTO: 10.08 K/UL — HIGH (ref 1.8–7.4)
NEUTROPHILS NFR BLD AUTO: 79.6 % — HIGH (ref 43–77)
NRBC # BLD: 0 /100 WBCS — SIGNIFICANT CHANGE UP (ref 0–0)
PLATELET # BLD AUTO: 177 K/UL — SIGNIFICANT CHANGE UP (ref 150–400)
POTASSIUM SERPL-MCNC: 3.5 MMOL/L — SIGNIFICANT CHANGE UP (ref 3.5–5.3)
POTASSIUM SERPL-SCNC: 3.5 MMOL/L — SIGNIFICANT CHANGE UP (ref 3.5–5.3)
PROT SERPL-MCNC: 6.3 G/DL — SIGNIFICANT CHANGE UP (ref 6–8.3)
RBC # BLD: 4.65 M/UL — SIGNIFICANT CHANGE UP (ref 3.8–5.2)
RBC # FLD: 12.6 % — SIGNIFICANT CHANGE UP (ref 10.3–14.5)
SODIUM SERPL-SCNC: 140 MMOL/L — SIGNIFICANT CHANGE UP (ref 135–145)
SPECIMEN SOURCE: SIGNIFICANT CHANGE UP
WBC # BLD: 12.68 K/UL — HIGH (ref 3.8–10.5)
WBC # FLD AUTO: 12.68 K/UL — HIGH (ref 3.8–10.5)

## 2022-05-08 PROCEDURE — 99232 SBSQ HOSP IP/OBS MODERATE 35: CPT

## 2022-05-08 PROCEDURE — 99223 1ST HOSP IP/OBS HIGH 75: CPT

## 2022-05-08 PROCEDURE — 99222 1ST HOSP IP/OBS MODERATE 55: CPT

## 2022-05-08 PROCEDURE — 99233 SBSQ HOSP IP/OBS HIGH 50: CPT | Mod: GC

## 2022-05-08 RX ORDER — HYDRALAZINE HCL 50 MG
50 TABLET ORAL EVERY 8 HOURS
Refills: 0 | Status: DISCONTINUED | OUTPATIENT
Start: 2022-05-08 | End: 2022-05-09

## 2022-05-08 RX ORDER — MEROPENEM 1 G/30ML
1000 INJECTION INTRAVENOUS EVERY 8 HOURS
Refills: 0 | Status: DISCONTINUED | OUTPATIENT
Start: 2022-05-08 | End: 2022-05-09

## 2022-05-08 RX ADMIN — SODIUM CHLORIDE 100 MILLILITER(S): 9 INJECTION, SOLUTION INTRAVENOUS at 09:45

## 2022-05-08 RX ADMIN — Medication 50 MILLIGRAM(S): at 21:49

## 2022-05-08 RX ADMIN — Medication 0.25 MILLIGRAM(S): at 15:55

## 2022-05-08 RX ADMIN — ATENOLOL 25 MILLIGRAM(S): 25 TABLET ORAL at 05:13

## 2022-05-08 RX ADMIN — Medication 50 MILLIGRAM(S): at 13:46

## 2022-05-08 RX ADMIN — MEROPENEM 100 MILLIGRAM(S): 1 INJECTION INTRAVENOUS at 13:45

## 2022-05-08 RX ADMIN — Medication 0.25 MILLIGRAM(S): at 05:12

## 2022-05-08 RX ADMIN — MEROPENEM 100 MILLIGRAM(S): 1 INJECTION INTRAVENOUS at 21:49

## 2022-05-08 NOTE — PROGRESS NOTE ADULT - SUBJECTIVE AND OBJECTIVE BOX
SUBJECTIVE:  Patient seen and examined at bedside. No overnight events. C/O some abdominal discomfort/soreness. She states her nausea is resolving. Currently NPO.    Vital Signs Last 24 Hrs  T(C): 37.6 (08 May 2022 05:05), Max: 37.6 (08 May 2022 05:05)  T(F): 99.6 (08 May 2022 05:05), Max: 99.6 (08 May 2022 05:05)  HR: 89 (08 May 2022 05:05) (67 - 89)  BP: 187/87 (08 May 2022 05:05) (169/81 - 187/87)  BP(mean): --  RR: 17 (08 May 2022 05:05) (16 - 17)  SpO2: 95% (08 May 2022 05:05) (95% - 99%)    PHYSICAL EXAM:  GENERAL: NAD, appears to be resting comfortably   HEAD:  Atraumatic, Normocephalic  ABDOMEN: Previously healed surgical scars noted. Soft, nondistended. Mild tenderness to palpation epigastric area. +BS  EXT: NO calf tenderness b/l.   NEUROLOGY: A&O x 3    MEDICATIONS  (STANDING):  ATENolol  Tablet 25 milliGRAM(s) Oral daily  dextrose 5% + sodium chloride 0.9%. 1000 milliLiter(s) (100 mL/Hr) IV Continuous <Continuous>    MEDICATIONS  (PRN):  ALPRAZolam 0.25 milliGRAM(s) Oral three times a day PRN anxiety  hydrALAZINE 25 milliGRAM(s) Oral every 8 hours PRN Systolic blood pressure >160  HYDROmorphone  Injectable 0.5 milliGRAM(s) IV Push every 6 hours PRN Severe Pain (7 - 10)  HYDROmorphone  Injectable 0.25 milliGRAM(s) IV Push every 6 hours PRN Moderate Pain (4 - 6)  ondansetron Injectable 4 milliGRAM(s) IV Push every 8 hours PRN Nausea and/or Vomiting  zolpidem 5 milliGRAM(s) Oral at bedtime PRN Insomnia  zolpidem 5 milliGRAM(s) Oral at bedtime PRN Insomnia      LABS: 5/8 AM labs pending

## 2022-05-08 NOTE — PROGRESS NOTE ADULT - ATTENDING COMMENTS
72 yo F with a PMHx of HTN, HLD, anxiety, migraines, ovarian ca s/p hysterectomy, hx of peritoneal carcinomatosis, biliary colic who presents with abdominal pain and emesis. Admit for gallstone pancreatitis, acute dmitriy and pancreatitic mass.    Keep NPO except meds, w/sips and chips  IV D5NS @ 100 cc/hr  Dilaudid 0.25 mg q6 PRN for mod pain, diluadid 0.5 mg q6 PRN for severe  s/p Zosyn x1 in ED. Now started on IV Meropanem per ID recommendations for necrotizing pancreatitis and acute cholecystitis.   ID (Dr. Choi) consulted, f/u recs  Surgery consulted Dr. Damico, recs appreciated   GI f/u appreciated.  Tentatively scheduled for ERCP Monday PM  Rest of the plan as above. D/w Resident
70 yo F with a PMHx of HTN, HLD, anxiety, migraines, ovarian ca s/p hysterectomy, hx of peritoneal carcinomatosis, biliary colic who presents with abdominal pain and emesis. Admit for gallstone pancreatitis, acute dmitriy and pancreatitic mass.    Keep NPO except meds, w/sips and chips  IV D5NS @ 100 cc/hr  Dilaudid 0.25 mg q6 PRN for mod pain, diluadid 0.5 mg q6 PRN for severe  s/p Zosyn x1 in ED  ID (Dr. Choi) consulted, f/u recs  Surgery consulted Dr. Damico, recs appreciated   GI consulted Dr. Bhardwaj, recs appreciated   Tentatively scheduled for ERCP Monday PM  Rest of the plan as above. D/w Resident

## 2022-05-08 NOTE — PROGRESS NOTE ADULT - PROBLEM SELECTOR PLAN 1
Pt with hx of cholecystitis, cholelithiasis and biliary colic s/p unsuccessful lap dmitriy due to obstructional adhesions presents with severe onset epigastric pain and emesis   Labs sign for Lipase >30,0000   CT abdomen/pelvis revealing acute cholecystitis, pancreatitis, duodenitis, Redemonstrated incompletely characterized opacity measuring 5 x 4.1 cm at  the level of the head/uncinate process of the pancreas. Again this may represent isolated peripancreatic necrotizing pancreatitis. However, given patient's history of peritoneal carcinomatosis and ovarian neoplasm, recurrent peritoneal carcinomatosis remains in the differential. Correlate with tumor markers.  Keep NPO except meds, w/sips and chips  IV D5NS @ 100 cc/hr  Dilaudid 0.25 mg q6 PRN for mod pain, diluadid 0.5 mg q6 PRN for severe  s/p Zosyn x1 in ED  ID (Dr. Choi) consulted, f/u recs- to be started on meropenem as per covering ID Dr Nguyen  GI consulted Dr. Bhardwaj, recs appreciated- tentatively scheduled for ERCP Monday PM, remains NPO, not on chemical dvt ppx.   Surgery consulted Dr. Damico, recs appreciated. Further workup and/or transfer pending ERCP results  Cardiology consulted for preop, clearance documented
Continue care as per primary team  Pain control, supportive care, OOB with assistance  NPO, IV fluids  GI reccs appreciated - tentative plan for ERCP tomorrow, Monday 5/9/22  Patient could benefit from transfer to tertiary facility for eval/workup by hepatobiliary team
Pt with hx of cholecystitis, cholelithiasis and biliary colic s/p unsuccessful lap dmitriy due to obstructional adhesions presents with severe onset epigastric pain and emesis   Labs sign for Lipase >30,0000   CT abdomen/pelvis revealing acute cholecystitis, pancreatitis, duodenitis, Redemonstrated incompletely characterized opacity measuring 5 x 4.1 cm at  the level of the head/uncinate process of the pancreas. Again this may represent isolated peripancreatic necrotizing pancreatitis. However, given patient's history of peritoneal carcinomatosis and ovarian neoplasm, recurrent peritoneal carcinomatosis remains in the differential. Correlate with tumor markers.  Keep NPO except meds, w/sips and chips  IV D5NS @ 100 cc/hr  Dilaudid 0.25 mg q6 PRN for mod pain, diluadid 0.5 mg q6 PRN for severe  s/p Zosyn x1 in ED  ID (Dr. Choi) consulted, f/u recs  Surgery consulted Dr. Damico, recs appreciated   GI consulted Dr. Bhardwaj, recs appreciated   Tentatively scheduled for ERCP Monday PM

## 2022-05-08 NOTE — CONSULT NOTE ADULT - ASSESSMENT
pancreatitis  abdominal pain  abnormal CT    pain clinically improved  ? gallstone versus alcohol  npo  sips of water/ice chips ok  iv fluid  pain control  will tentatively keep on schedule for ercp on monday pm  d/w patient and 
70 YO Female w/ PMHx of HTN, Palpitations, MVP, Hypercholesterolemia, Migraines, Ovarian Cancer s/p bilateral oopherectomy (2012), s/p ROSALIA, s/p colon resection, Chronic Cholecystitic s/p diagnostic laparoscopy w/ aborted cholecystectomy 2/2 obstructive adhesions (4/21/22, Dr. Najera) p/w abdominal pain x 7 hours. CT abdomen & pelvis pending, but appears to demonstrate pancreatic mass & dilated pancreatic ducts
Pt is a 70 yo F with a PMHx of HTN, HLD, anxiety, migraines, ovarian ca s/p hysterectomy, biliary colic who presents with abdominal pain and emesis, gallstone pancreatitis, for ERCP tomorrow- Optimized for the OR from a cardiac point of view with no evidence of active ischemic heart disease, decompensated heart failure, severe obstructive valvular disease, or uncontrolled arrhythmia.    - Optimized for the OR from a cardiac point of view with no evidence of active ischemic heart disease, decompensated heart failure, severe obstructive valvular disease, or uncontrolled arrhythmia.  - Continue atenolol at current dose  - Agree with adding and increasing hydralazine for BP control  - Monitor and replete lytes  - TO follow closely while admitted  
Pt is a 72 yo F with a PMHx of HTN, HLD, anxiety, migraines, ovarian ca s/p hysterectomy, biliary colic who presents with abdominal pain and emesis. Pt says this evening at around 5 pm she felt sudden onset abdominal pain. Says she has had similar pain from prior gallbladder attacks, but not quite as severe. Said the pain was constant, located in the epigastric region and radiating to her back between her should blades. She used warm compresses for the pain without relief, which prompted her to come to the ER for further evaluation. She also states she had multiple episodes of emesis prior to arrival.   WBC 11.36, AST//177, Lipase >30,000.   CT (I personally reviewed) Re-demonstrated incompletely characterized opacity measuring 5 x 4.1 cm at the level of the head/uncinate process of the pancreas. Again this may represent isolated peripancreatic necrotizing pancreatitis.  bilirubin up today 1.5 from 1.0  given the imaging findings and her initial presentation, the numerous bouts of cholecystitis and pancreatitis which she has now, would cover for necrotizing pancreatitis which is often treated with carbapenem     Plan:   start meropenem 1g q8hrs  blood cultures if febrile   follow lipase   trend bilirubin   ERCP planned for tomorrow   advance diet as able and tolerated     Discussed with Dr. Marcelino Camargo, DO  Infectious Disease Attending  Reachable via Microsoft Teams or ID office: [phone]  After 5pm/weekends please call [phone] for all inquiries and new consults

## 2022-05-08 NOTE — PROGRESS NOTE ADULT - TIME BILLING
Meds, labs, vitals, consult notes reviewed. Plan d/w patient.
Meds, labs, vitals, consult notes reviewed. Plan d/w patient.

## 2022-05-08 NOTE — CONSULT NOTE ADULT - SUBJECTIVE AND OBJECTIVE BOX
HPI: 72 YO Female w/ PMHx of HTN, Palpitations, MVP, Hypercholesterolemia, Migraines, Ovarian Cancer s/p bilateral oopherectomy (), s/p ROSALIA, s/p colon resection, Chronic Cholecystitic s/p diagnostic laparoscopy w/ aborted cholecystectomy 2/2 obstructive adhesions (22, Dr. Najera) p/w abdominal pain x 7 hours. Patient states that around 5pm she began experiencing sudden onset of epigastric pain radiating to the lower back associated with nausea and multiple episodes of emesis. During last hospital admission, patient advised to follow-up outpatient with Dr. Diaz, hepatobiliary specialist, for further work-up and management, who she has not yet seen. Seen by Dr. Bhardwaj and scheduled for ERCP next 22. As per patient, she was told that if the procedure was successful, she may not need hepatobiliary follow-up. At present, denies fever, chills, chest pain, SOB, diarrhea, constipation, hematemesis or hematochezia.     PAST MEDICAL & SURGICAL HISTORY:  Ovarian malignant neoplasm  DX     H/O: HTN (hypertension)    MVP (mitral valve prolapse)    Sciatica    Shoulder joint pain  right shoulder pain    Peritoneal carcinomatosis    Hypercholesterolemia    Hay fever    Seasonal allergies    Migraines    Chronic cholecystitis    Calculus of bile duct with cholangitis, unspecified, without obstruction    H/O colonoscopy    S/P left oophorectomy    S/P right oophorectomy    S/P ROSALIA (total abdominal hysterectomy)  ()    History of colon resection  ()    CONSTITUTIONAL: No weakness, fevers or chills  EYES/ENT: No visual changes;  No vertigo or throat pain   NECK: No pain or stiffness  RESPIRATORY: No cough, wheezing, hemoptysis; No shortness of breath  CARDIOVASCULAR: No chest pain or palpitations  GASTROINTESTINAL: See HPI  GENITOURINARY: No dysuria, frequency or hematuria  NEUROLOGICAL: No numbness or weakness  SKIN: No itching, burning, rashes, or lesions   All other review of systems is negative unless indicated above.    MEDICATIONS  (STANDING):  ondansetron Injectable 4 milliGRAM(s) IV Push once    MEDICATIONS  (PRN)    Allergies    adhesives (Other)  dairy products (Other)  SEASONAL ALLERGIES - Post Nasal Drip (Other)  Sulfur (Rash)    Intolerances    FAMILY HISTORY:  Family history of myocardial infarction at age less than 60 (Father)  Father -  Age 70    Family history of colon cancer (Mother)  Mother -  Age 69    Vital Signs Last 24 Hrs  T(C): 36.6 (06 May 2022 23:48), Max: 36.6 (06 May 2022 23:48)  T(F): 97.8 (06 May 2022 23:48), Max: 97.8 (06 May 2022 23:48)  HR: 71 (06 May 2022 23:48) (67 - 71)  BP: 167/82 (06 May 2022 23:48) (167/82 - 180/86)  BP(mean): --  RR: 17 (06 May 2022 23:48) (16 - 96)  SpO2: 97% (06 May 2022 23:48) (97% - 97%)    GENERAL:  Well-nourished, well-developed Female lying comfortably in bed in NAD.  HEENT:  Sclera white. Mucous membranes moist.  ABDOMEN:  Soft, nondistended, mild ttp of epigastrium. Trocar sites well approximated w/ some flaking dermabond present over scars.   NEURO:  A&O x 3    LABS:                        14.6   11.36 )-----------( 223      ( 06 May 2022 20:50 )             44.8     05-    140  |  105  |  27<H>  ----------------------------<  136<H>  4.0   |  28  |  1.20    Ca    10.0      06 May 2022 20:50    TPro  7.6  /  Alb  3.9  /  TBili  0.9  /  DBili  x   /  AST  262<H>  /  ALT  177<H>  /  AlkPhos  168<H>  -    Urinalysis Basic - ( 06 May 2022 21:47 )    Color: Yellow / Appearance: Slightly Turbid / S.025 / pH: x  Gluc: x / Ketone: Trace  / Bili: Negative / Urobili: Negative   Blood: x / Protein: 30 mg/dL / Nitrite: Negative   Leuk Esterase: Trace / RBC: 6-10 /HPF / WBC 3-5   Sq Epi: x / Non Sq Epi: Few / Bacteria: Few    RADIOLOGY & ADDITIONAL STUDIES: Official read pending   
GRANT RICHARD  MRN-[id number]        Patient is a 71y old  Female who presents with a chief complaint of Pancreatitis (08 May 2022 11:30)      HPI:  Pt is a 70 yo F with a PMHx of HTN, HLD, anxiety, migraines, ovarian ca s/p hysterectomy, biliary colic who presents with abdominal pain and emesis. Pt says this evening at around 5 pm she felt sudden onset abdominal pain. Says she has had similar pain from prior gallbladder attacks, but not quite as severe. Said the pain was constant, located in the epigastric region and radiating to her back between her should blades. She used warm compresses for the pain without relief, which prompted her to come to the ER for further evaluation. She also states she had multiple episodes of emesis prior to arrival.     In the ED, labwork revealing WBC 11.36, AST//177, Lipase >30,000. She received IV Dilaudid x2, zofran x 2 and 2 L NS boluses. At the of my examination, pt says pain is improved, now 3/10. Said nausea resolved. Denies fevers, chills, chest pain, SOB, melena or hematochezia. Of note, pt admitted 2 weeks ago for elective lap dmitriy due to biliary pain. Procedure was unsuccessful due to obstructive adhesions. Was scheduled for ERCP this Monday with Dr. Bhardwaj  (07 May 2022 00:42)    Patient agrees with above. She is feeling better today but still not 100%. has had several episodes of these events but this was worst of them all. ID consulted for workup and antibiotic management     PAST MEDICAL & SURGICAL HISTORY:  Ovarian malignant neoplasm  DX     H/O: HTN (hypertension)    MVP (mitral valve prolapse)    Sciatica    Shoulder joint pain  right shoulder pain    Peritoneal carcinomatosis    Hypercholesterolemia    Hay fever    Seasonal allergies    Migraines    Chronic cholecystitis    Calculus of bile duct with cholangitis, unspecified, without obstruction    H/O colonoscopy    S/P left oophorectomy    S/P right oophorectomy    S/P ROSALIA (total abdominal hysterectomy)  ()    History of colon resection  ()        Allergies  adhesives (Other)  dairy products (Other)  SEASONAL ALLERGIES - Post Nasal Drip (Other)  Sulfur (Rash)        ANTIMICROBIALS:  meropenem  IVPB 1000 every 8 hours      MEDICATIONS  (STANDING):    piperacillin/tazobactam IVPB.   200 mL/Hr IV Intermittent (05-07-22 @ 01:56)        OTHER MEDS: MEDICATIONS  (STANDING):  ALPRAZolam 0.25 three times a day PRN  ATENolol  Tablet 25 daily  hydrALAZINE 50 every 8 hours  HYDROmorphone  Injectable 0.5 every 6 hours PRN  HYDROmorphone  Injectable 0.25 every 6 hours PRN  ondansetron Injectable 4 every 8 hours PRN  zolpidem 5 at bedtime PRN  zolpidem 5 at bedtime PRN      SOCIAL HISTORY:     Denies smoking, etoh or drugs     FAMILY HISTORY:  Family history of myocardial infarction at age less than 60 (Father)  Father -  Age 70    Family history of colon cancer (Mother)  Mother -  Age 69      REVIEW OF SYSTEMS  [  ] ROS unobtainable because:    [ X ] All other systems negative except as noted below:	    Constitutional:  [ ] fever [ ] chills  [ ] weight loss  [ ] weakness  Skin:  [ ] rash [ ] phlebitis	  Eyes: [ ] icterus [ ] pain  [ ] discharge	  ENMT: [ ] sore throat  [ ] thrush [ ] ulcers [ ] exudates  Respiratory: [ ] dyspnea [ ] hemoptysis [ ] cough [ ] sputum	  Cardiovascular:  [ ] chest pain [ ] palpitations [ ] edema	  Gastrointestinal:  [X ] nausea [ X] vomiting [ ] diarrhea [ ] constipation [ X] pain	  Genitourinary:  [ ] dysuria [ ] frequency [ ] hematuria [ ] discharge [ ] flank pain  [ ] incontinence  Musculoskeletal:  [ ] myalgias [ ] arthralgias [ ] arthritis  [ ] back pain  Neurological:  [ ] headache [ ] seizures  [ ] confusion/altered mental status  Psychiatric:  [ ] anxiety [ ] depression	  Hematology/Lymphatics:  [ ] lymphadenopathy  Endocrine:  [ ] adrenal [ ] thyroid  Allergic/Immunologic:	 [ ] transplant [ ] seasonal    Vital Signs Last 24 Hrs  T(F): 99.4 (22 @ 12:18), Max: 99.6 (22 @ 05:05)    Vital Signs Last 24 Hrs  HR: 68 (22 @ 12:18) (68 - 89)  BP: 185/92 (22 @ 12:18) (181/91 - 187/87)  RR: 17 (22 @ 12:18)  SpO2: 96% (22 @ 12:18) (95% - 97%)  Wt(kg): --    PHYSICAL EXAM:  Constitutional: non-toxic, no distress  HEAD/EYES: anicteric, no conjunctival injection  ENT:  supple, no thrush  Cardiovascular:   normal S1, S2, no murmur, no edema  Respiratory:  clear BS bilaterally, no wheezes, no rales  GI:  soft, mildly tender upon deep palpation, normal bowel sounds  :  no costello, no CVA tenderness  Musculoskeletal:  no synovitis, normal ROM  Neurologic: awake and alert, normal strength, no focal findings  Skin:  no rash, no erythema, no phlebitis  Heme/Onc: no lymphadenopathy   Psychiatric:  awake, alert, appropriate mood          WBC Count: 12.68 K/uL ( @ 08:09)  WBC Count: 8.76 K/uL ( @ 08:30)  WBC Count: 11.36 K/uL ( @ 20:50)      Auto Neutrophil %: 79.6 % *H* (22 @ 08:09)  Auto Neutrophil #: 10.08 K/uL *H* (22 @ 08:09)  Auto Neutrophil %: 76.4 % (22 @ 08:30)  Auto Neutrophil #: 6.69 K/uL (22 @ 08:30)  Auto Neutrophil %: 73.2 % (22 @ 20:50)  Auto Neutrophil #: 8.32 K/uL *H* (22 @ 20:50)                            14.3   12.68 )-----------( 177      ( 08 May 2022 08:09 )             42.8           140  |  108  |  11  ----------------------------<  111<H>  3.5   |  23  |  0.74    Ca    8.9      08 May 2022 08:09    TPro  6.3  /  Alb  2.9<L>  /  TBili  1.5<H>  /  DBili  x   /  AST  31  /  ALT  69  /  AlkPhos  112        Creatinine Trend: 0.74<--, 1.00<--, 1.20<--, 1.20<--, 1.10<--, 1.00<--    Urinalysis Basic - ( 06 May 2022 21:47 )    Color: Yellow / Appearance: Slightly Turbid / S.025 / pH: x  Gluc: x / Ketone: Trace  / Bili: Negative / Urobili: Negative   Blood: x / Protein: 30 mg/dL / Nitrite: Negative   Leuk Esterase: Trace / RBC: 6-10 /HPF / WBC 3-5   Sq Epi: x / Non Sq Epi: Few / Bacteria: Few        Lactate, Blood: 1.0 mmol/L (22 @ 20:50)      MICROBIOLOGY:    COVID-19 PCR: NotDetec (06 May 2022 21:49)  COVID-19 PCR: NotDetec (06 May 2022 14:11)  COVID-19 PCR: NotDetec (2022 15:52)      COVID-19 PCR: NotDetec ()  COVID-19 PCR: NotDetec ()      COVID-19 PCR: NotDetec (22 @ 21:49)  COVID-19 PCR: NotDetec (22 @ 14:11)      RADIOLOGY:  CT Abdomen and Pelvis No Cont (22 @ 23:15)  IMPRESSION:    Distended gallbladder containing gallstones with mild gallbladder wall   edema, likely in keeping with reported acute cholecystitis. No CT   findings of perforation at this time.    Diffuse markedly edematous pancreas with peripancreatic stranding and   fluid, compatible with acute pancreatitis.    Re-demonstrated incompletely characterized opacity measuring 5 x 4.1 cm at   the level of the head/uncinate process of the pancreas. Again this may   represent isolated peripancreatic necrotizing pancreatitis. However,   given patient's history of peritoneal carcinomatosis and ovarian   neoplasm, recurrent peritoneal carcinomatosis remains in the   differential. Correlate with tumor markers.    Thickened proximal duodenal folds with adjacent to the inflamed pancreas,   likely reactive duodenitis.    Mild ascites.      
[facility] - Usha Mckeon, Andrea Zimmer, Ronaldo, Sal Gonzales  Office Number: [phone]    Initial Consult Note    CHIEF COMPLAINT: Patient is a 71y old  Female who presents with a chief complaint of Pancreatitis       HPI:  Pt is a 70 yo F with a PMHx of HTN, HLD, anxiety, migraines, ovarian ca s/p hysterectomy, biliary colic who presents with abdominal pain and emesis. Pt says this evening at around 5 pm she felt sudden onset abdominal pain. Says she has had similar pain from prior gallbladder attacks, but not quite as severe. Said the pain was constant, located in the epigastric region and radiating to her back between her should blades. She used warm compresses for the pain without relief, which prompted her to come to the ER for further evaluation. She also states she had multiple episodes of emesis prior to arrival.     In the ED, labwork revealing WBC 11.36, AST//177, Lipase >30,000. She received IV Dilaudid x2, zofran x 2 and 2 L NS boluses. At the of my examination, pt says pain is improved, now 3/10. Said nausea resolved. Denies fevers, chills, chest pain, SOB, melena or hematochezia. Of note, pt admitted 2 weeks ago for elective lap dmitriy due to biliary pain. Procedure was unsuccessful due to obstructive adhesions. Was scheduled for ERCP this Monday with Dr. Bhardwaj    Patient now planned for ERCP tomorrow.  She exercises on the treadmill and by taking  her dogs for two mile walks at home.  She never has any chest pain with exertion, and is not reporting a declining ET.  She denies PND, orthopnea, LE swelling, dizziness, or syncope.  She follows with a cardiologist for her blood pressure  management.  No history of CAD, CHF, CKD, CVA, or DM.       PAST MEDICAL & SURGICAL HISTORY:  Ovarian malignant neoplasm  DX     H/O: HTN (hypertension)    MVP (mitral valve prolapse)    Sciatica    Shoulder joint pain  right shoulder pain    Peritoneal carcinomatosis    Hypercholesterolemia    Hay fever    Seasonal allergies    Migraines    Chronic cholecystitis    Calculus of bile duct with cholangitis, unspecified, without obstruction    H/O colonoscopy    S/P left oophorectomy    S/P right oophorectomy    S/P ROSALIA (total abdominal hysterectomy)  (2012)    History of colon resection  (2012)        SOCIAL HISTORY:  No tobacco, ethanol, or drug abuse.    FAMILY HISTORY:  Family history of myocardial infarction at age less than 60 (Father)  Father -  Age 70    Family history of colon cancer (Mother)  Mother -  Age 69           MEDICATIONS  (STANDING):  ATENolol  Tablet 25 milliGRAM(s) Oral daily  dextrose 5% + sodium chloride 0.9%. 1000 milliLiter(s) (100 mL/Hr) IV Continuous <Continuous>  hydrALAZINE 50 milliGRAM(s) Oral every 8 hours    MEDICATIONS  (PRN):  ALPRAZolam 0.25 milliGRAM(s) Oral three times a day PRN anxiety  HYDROmorphone  Injectable 0.5 milliGRAM(s) IV Push every 6 hours PRN Severe Pain (7 - 10)  HYDROmorphone  Injectable 0.25 milliGRAM(s) IV Push every 6 hours PRN Moderate Pain (4 - 6)  ondansetron Injectable 4 milliGRAM(s) IV Push every 8 hours PRN Nausea and/or Vomiting  zolpidem 5 milliGRAM(s) Oral at bedtime PRN Insomnia  zolpidem 5 milliGRAM(s) Oral at bedtime PRN Insomnia      Allergies    adhesives (Other)  dairy products (Other)  SEASONAL ALLERGIES - Post Nasal Drip (Other)  Sulfur (Rash)             REVIEW OF SYSTEMS:       All other review of systems is negative unless indicated above    VITAL SIGNS:   Vital Signs Last 24 Hrs  T(C): 37.6 (08 May 2022 05:05), Max: 37.6 (08 May 2022 05:05)  T(F): 99.6 (08 May 2022 05:05), Max: 99.6 (08 May 2022 05:05)  HR: 89 (08 May 2022 05:05) (77 - 89)  BP: 187/87 (08 May 2022 05:05) (181/91 - 187/87)  BP(mean): --  RR: 17 (08 May 2022 05:05) (17 - 17)  SpO2: 95% (08 May 2022 05:05) (95% - 97%)    I&O's Summary    07 May 2022 07:01  -  08 May 2022 07:00  --------------------------------------------------------  IN: 0 mL / OUT: 0 mL / NET: 0 mL        On Exam:    Constitutional: NAD, alert and oriented x 3  Lungs:  Non-labored, breath sounds are clear bilaterally, No wheezing, rales or rhonchi  Cardiovascular: RRR.  S1 and S2 positive.  No murmurs, rubs, gallops or clicks  Gastrointestinal: Bowel Sounds present, soft, nontender.   Lymph: No peripheral edema. No cervical lymphadenopathy.  Neurological: Alert, no focal deficits  Skin: No rashes or ulcers   Psych:  Mood & affect appropriate.    LABS: All Labs Reviewed:                        14.3   12. )-----------( 177      ( 08 May 2022 08:09 )             42.8                         14.3   8.76  )-----------( 202      ( 07 May 2022 08:30 )             43.3                         14.6   11.36 )-----------( 223      ( 06 May 2022 20:50 )             44.8     08 May 2022 08:09    140    |  108    |  11     ----------------------------<  111    3.5     |  23     |  0.74   07 May 2022 08:30    142    |  111    |  21     ----------------------------<  104    4.5     |  27     |  1.00   06 May 2022 20:50    140    |  105    |  27     ----------------------------<  136    4.0     |  28     |  1.20     Ca    8.9        08 May 2022 08:09  Ca    8.5        07 May 2022 08:30  Ca    10.0       06 May 2022 20:50    TPro  6.3    /  Alb  2.9    /  TBili  1.5    /  DBili  x      /  AST  31     /  ALT  69     /  AlkPhos  112    08 May 2022 08:09  TPro  5.9    /  Alb  2.7    /  TBili  1.0    /  DBili  0.2    /  AST  77     /  ALT  110    /  AlkPhos  129    07 May 2022 08:30  TPro  7.6    /  Alb  3.9    /  TBili  0.9    /  DBili  x      /  AST  262    /  ALT  177    /  AlkPhos  168    06 May 2022 20:50               RADIOLOGY:    EKG:  NSR.  NO acute or ischemic ST or T wave changes.       
[facility]  Stanton Garg PA-C  [facility] [place] [address]  [phone]      Chief Complaint:  Patient is a 71y old  Female who presents with a chief complaint of Pancreatitis (07 May 2022 00:42)      HPI: Pt is a 70 yo F with a PMHx of HTN, HLD, anxiety, migraines, ovarian ca s/p hysterectomy, biliary colic who presents with abdominal pain and emesis. Pt says this evening at around 5 pm she felt sudden onset abdominal pain. Says she has had similar pain from prior gallbladder attacks, but not quite as severe. Said the pain was constant, located in the epigastric region and radiating to her back between her should blades. She used warm compresses for the pain without relief, which prompted her to come to the ER for further evaluation. She also states she had multiple episodes of emesis prior to arrival.       Allergies:  adhesives (Other)  dairy products (Other)  SEASONAL ALLERGIES - Post Nasal Drip (Other)  Sulfur (Rash)      Medications:  ALPRAZolam 0.25 milliGRAM(s) Oral three times a day PRN  ATENolol  Tablet 25 milliGRAM(s) Oral daily  hydrALAZINE 25 milliGRAM(s) Oral every 8 hours PRN  HYDROmorphone  Injectable 0.5 milliGRAM(s) IV Push every 6 hours PRN  HYDROmorphone  Injectable 0.25 milliGRAM(s) IV Push every 6 hours PRN  ondansetron Injectable 4 milliGRAM(s) IV Push every 8 hours PRN  sodium chloride 0.9%. 1000 milliLiter(s) IV Continuous <Continuous>  zolpidem 5 milliGRAM(s) Oral at bedtime PRN  zolpidem 5 milliGRAM(s) Oral at bedtime PRN      PMHX/PSHX:  Ovarian malignant neoplasm    H/O: HTN (hypertension)    MVP (mitral valve prolapse)    Sciatica    Shoulder joint pain    Peritoneal carcinomatosis    Nephrolithiasis    Hypercholesterolemia    Hay fever    Seasonal allergies    Migraines    Chronic cholecystitis    Calculus of bile duct with cholangitis, unspecified, without obstruction    H/O colonoscopy    S/P left oophorectomy    S/P right oophorectomy    S/P ROSALIA (total abdominal hysterectomy)    History of colon resection        Family history:  Family history of myocardial infarction at age less than 60 (Father)    Family history of colon cancer (Mother)        Social History:     ROS:     General:  No wt loss, fevers, chills, night sweats, fatigue,   Eyes:  Good vision, no reported pain  ENT:  No sore throat, pain, runny nose, dysphagia  CV:  No pain, palpitations, hypo/hypertension  Resp:  No dyspnea, cough, tachypnea, wheezing  GI:  + pain, No nausea, No vomiting, No diarrhea, No constipation, No weight loss, No fever, No pruritis, No rectal bleeding, No tarry stools, No dysphagia,  :  No pain, bleeding, incontinence, nocturia  Muscle:  No pain, weakness  Neuro:  No weakness, tingling, memory problems  Psych:  No fatigue, insomnia, mood problems, depression  Endocrine:  No polyuria, polydipsia, cold/heat intolerance  Heme:  No petechiae, ecchymosis, easy bruisability  Skin:  No rash, tattoos, scars, edema      PHYSICAL EXAM:   Vital Signs:  Vital Signs Last 24 Hrs  T(C): 37.1 (07 May 2022 09:12), Max: 37.1 (07 May 2022 09:12)  T(F): 98.8 (07 May 2022 09:12), Max: 98.8 (07 May 2022 09:12)  HR: 77 (07 May 2022 09:12) (67 - 77)  BP: 172/82 (07 May 2022 09:12) (167/82 - 190/76)  BP(mean): --  RR: 16 (07 May 2022 09:12) (16 - 96)  SpO2: 98% (07 May 2022 09:12) (97% - 98%)  Daily Height in cm: 325.12 (06 May 2022 19:23)    Daily     GENERAL:  Appears stated age,   HEENT:  NC/AT,    CHEST:  Full & symmetric excursion,   HEART:  Regular rhythm  ABDOMEN:  Soft, non-tender, non-distended,   EXTEREMITIES:  no cyanosis,clubbing or edema  SKIN:  No rash  NEURO:  Alert,    LABS:                        14.3   8.76  )-----------( 202      ( 07 May 2022 08:30 )             43.3     -    142  |  111<H>  |  21  ----------------------------<  104<H>  4.5   |  27  |  1.00    Ca    8.5      07 May 2022 08:30    TPro  5.9<L>  /  Alb  2.7<L>  /  TBili  1.0  /  DBili  0.2  /  AST  77<H>  /  ALT  110<H>  /  AlkPhos  129<H>      LIVER FUNCTIONS - ( 07 May 2022 08:30 )  Alb: 2.7 g/dL / Pro: 5.9 g/dL / ALK PHOS: 129 U/L / ALT: 110 U/L / AST: 77 U/L / GGT: x             Urinalysis Basic - ( 06 May 2022 21:47 )    Color: Yellow / Appearance: Slightly Turbid / S.025 / pH: x  Gluc: x / Ketone: Trace  / Bili: Negative / Urobili: Negative   Blood: x / Protein: 30 mg/dL / Nitrite: Negative   Leuk Esterase: Trace / RBC: 6-10 /HPF / WBC 3-5   Sq Epi: x / Non Sq Epi: Few / Bacteria: Few      Amylase Serum--      Lipase serum>[id number]       Ammonia--      Imaging:

## 2022-05-08 NOTE — PROGRESS NOTE ADULT - SUBJECTIVE AND OBJECTIVE BOX
[facility]  Stanton Garg PA-C  [facility] [place] [address]  [phone]      INTERVAL HPI/OVERNIGHT EVENTS:    feels so much better    MEDICATIONS  (STANDING):  ATENolol  Tablet 25 milliGRAM(s) Oral daily  dextrose 5% + sodium chloride 0.9%. 1000 milliLiter(s) (100 mL/Hr) IV Continuous <Continuous>  hydrALAZINE 50 milliGRAM(s) Oral every 8 hours  meropenem  IVPB 1000 milliGRAM(s) IV Intermittent every 8 hours    MEDICATIONS  (PRN):  ALPRAZolam 0.25 milliGRAM(s) Oral three times a day PRN anxiety  HYDROmorphone  Injectable 0.5 milliGRAM(s) IV Push every 6 hours PRN Severe Pain (7 - 10)  HYDROmorphone  Injectable 0.25 milliGRAM(s) IV Push every 6 hours PRN Moderate Pain (4 - 6)  ondansetron Injectable 4 milliGRAM(s) IV Push every 8 hours PRN Nausea and/or Vomiting  zolpidem 5 milliGRAM(s) Oral at bedtime PRN Insomnia  zolpidem 5 milliGRAM(s) Oral at bedtime PRN Insomnia      Allergies    adhesives (Other)  dairy products (Other)  SEASONAL ALLERGIES - Post Nasal Drip (Other)  Sulfur (Rash)    Intolerances        ROS:   General:  No wt loss, fevers, chills, night sweats, fatigue,   Eyes:  Good vision, no reported pain  ENT:  No sore throat, pain, runny nose, dysphagia  CV:  No pain, palpitations, hypo/hypertension  Resp:  No dyspnea, cough, tachypnea, wheezing  GI:  No pain, No nausea, No vomiting, No diarrhea, No constipation, No weight loss, No fever, No pruritis, No rectal bleeding, No tarry stools, No dysphagia,  :  No pain, bleeding, incontinence, nocturia  Muscle:  No pain, weakness  Neuro:  No weakness, tingling, memory problems  Psych:  No fatigue, insomnia, mood problems, depression  Endocrine:  No polyuria, polydipsia, cold/heat intolerance  Heme:  No petechiae, ecchymosis, easy bruisability  Skin:  No rash, tattoos, scars, edema      PHYSICAL EXAM:   Vital Signs:  Vital Signs Last 24 Hrs  T(C): 37.4 (08 May 2022 12:18), Max: 37.6 (08 May 2022 05:05)  T(F): 99.4 (08 May 2022 12:18), Max: 99.6 (08 May 2022 05:05)  HR: 77 (08 May 2022 13:41) (68 - 89)  BP: 164/83 (08 May 2022 13:41) (164/83 - 187/87)  BP(mean): --  RR: 17 (08 May 2022 12:18) (17 - 17)  SpO2: 96% (08 May 2022 12:18) (95% - 97%)  Daily     Daily Weight in k.4 (08 May 2022 05:05)    GENERAL:  Appears stated age,   HEENT:  NC/AT,    CHEST:  Full & symmetric excursion,   HEART:  Regular rhythm,  ABDOMEN:  Soft, non-tender, non-distended,  EXTEREMITIES:  no cyanosis  SKIN:  No rash  NEURO:  Alert,       LABS:                        14.3   12.68 )-----------( 177      ( 08 May 2022 08:09 )             42.8     05-08    140  |  108  |  11  ----------------------------<  111<H>  3.5   |  23  |  0.74    Ca    8.9      08 May 2022 08:09    TPro  6.3  /  Alb  2.9<L>  /  TBili  1.5<H>  /  DBili  x   /  AST  31  /  ALT  69  /  AlkPhos  112  05-08      Urinalysis Basic - ( 06 May 2022 21:47 )    Color: Yellow / Appearance: Slightly Turbid / S.025 / pH: x  Gluc: x / Ketone: Trace  / Bili: Negative / Urobili: Negative   Blood: x / Protein: 30 mg/dL / Nitrite: Negative   Leuk Esterase: Trace / RBC: 6-10 /HPF / WBC 3-5   Sq Epi: x / Non Sq Epi: Few / Bacteria: Few        RADIOLOGY & ADDITIONAL TESTS:

## 2022-05-08 NOTE — PROGRESS NOTE ADULT - PROBLEM SELECTOR PLAN 3
LFT elevated on admission   Likely from gallstone pancreatitis/cholecystitis   Avoid hepatotoxins   GI consult
LFT elevated on admission - resolved  Likely from gallstone pancreatitis/cholecystitis   Avoid hepatotoxins   GI consult

## 2022-05-08 NOTE — PROGRESS NOTE ADULT - NS ATTEND AMEND GEN_ALL_CORE FT
Patient seen and examined.  Looks well, comfortable.  Leukocytosis this morning.  LFT's normalize however TB slightly higher.  Denies abdominal pain, nausea or vomiting.  Continue supportive care, IVF's, Abx, PPI's  GI for possible ERCP tomorrow

## 2022-05-08 NOTE — PROGRESS NOTE ADULT - PROBLEM SELECTOR PLAN 4
Chronic  BP elevated in the ED  Likely from pain   Continue home atenolol 25 mg qd   Will increase hydralazine to 50mg q8 for persistently elevated bps  Cardio consulted, recs appreciated
Chronic  BP elevated in the ED  Likely from pain   Continue home atenolol 25 mg qd   Will add Hydralazine 25mg PO q6h PRN for SBP>160

## 2022-05-08 NOTE — PROGRESS NOTE ADULT - SUBJECTIVE AND OBJECTIVE BOX
Patient is a 71y old  Female who presents with a chief complaint of Pancreatitis (08 May 2022 10:33)      INTERVAL HPI/OVERNIGHT EVENTS: Patient seen and examined at bedside. No overnight events occurred. Feels well overall, abdominal pain much improved.     MEDICATIONS  (STANDING):  ATENolol  Tablet 25 milliGRAM(s) Oral daily  dextrose 5% + sodium chloride 0.9%. 1000 milliLiter(s) (100 mL/Hr) IV Continuous <Continuous>  hydrALAZINE 50 milliGRAM(s) Oral every 8 hours  meropenem  IVPB 1000 milliGRAM(s) IV Intermittent every 8 hours    MEDICATIONS  (PRN):  ALPRAZolam 0.25 milliGRAM(s) Oral three times a day PRN anxiety  HYDROmorphone  Injectable 0.5 milliGRAM(s) IV Push every 6 hours PRN Severe Pain (7 - 10)  HYDROmorphone  Injectable 0.25 milliGRAM(s) IV Push every 6 hours PRN Moderate Pain (4 - 6)  ondansetron Injectable 4 milliGRAM(s) IV Push every 8 hours PRN Nausea and/or Vomiting  zolpidem 5 milliGRAM(s) Oral at bedtime PRN Insomnia  zolpidem 5 milliGRAM(s) Oral at bedtime PRN Insomnia      Allergies    adhesives (Other)  dairy products (Other)  SEASONAL ALLERGIES - Post Nasal Drip (Other)  Sulfur (Rash)    Intolerances        REVIEW OF SYSTEMS:  CONSTITUTIONAL: No fever or chills  HEENT:  No headache, no sore throat  RESPIRATORY: No cough, wheezing, or shortness of breath  CARDIOVASCULAR: No chest pain, palpitations  GASTROINTESTINAL:  abd pain improving, denies nausea and vomiting, no diarrhea  GENITOURINARY: No dysuria, frequency, or hematuria  NEUROLOGICAL: no focal weakness or dizziness  MUSCULOSKELETAL: back pain resolved    Vital Signs Last 24 Hrs  T(C): 37.6 (08 May 2022 05:05), Max: 37.6 (08 May 2022 05:05)  T(F): 99.6 (08 May 2022 05:05), Max: 99.6 (08 May 2022 05:05)  HR: 89 (08 May 2022 05:05) (77 - 89)  BP: 187/87 (08 May 2022 05:05) (181/91 - 187/87)  BP(mean): --  RR: 17 (08 May 2022 05:05) (17 - 17)  SpO2: 95% (08 May 2022 05:05) (95% - 97%)    PHYSICAL EXAM:  GENERAL: NAD  HEENT:  anicteric, moist mucous membranes  CHEST/LUNG:  CTA b/l, no rales, wheezes, or rhonchi  HEART:  RRR, S1, S2  ABDOMEN:  BS+, soft,  nondistended, mildly TTP in epigastric region to deep palpation  EXTREMITIES: no edema, cyanosis, or calf tenderness  NERVOUS SYSTEM: answers questions and follows commands appropriately    LABS:                        14.3   12.68 )-----------( 177      ( 08 May 2022 08:09 )             42.8     CBC Full  -  ( 08 May 2022 08:09 )  WBC Count : 12.68 K/uL  Hemoglobin : 14.3 g/dL  Hematocrit : 42.8 %  Platelet Count - Automated : 177 K/uL  Mean Cell Volume : 92.0 fl  Mean Cell Hemoglobin : 30.8 pg  Mean Cell Hemoglobin Concentration : 33.4 gm/dL  Auto Neutrophil # : 10.08 K/uL  Auto Lymphocyte # : 1.07 K/uL  Auto Monocyte # : 1.37 K/uL  Auto Eosinophil # : 0.09 K/uL  Auto Basophil # : 0.03 K/uL  Auto Neutrophil % : 79.6 %  Auto Lymphocyte % : 8.4 %  Auto Monocyte % : 10.8 %  Auto Eosinophil % : 0.7 %  Auto Basophil % : 0.2 %    08 May 2022 08:09    140    |  108    |  11     ----------------------------<  111    3.5     |  23     |  0.74     Ca    8.9        08 May 2022 08:09    TPro  6.3    /  Alb  2.9    /  TBili  1.5    /  DBili  x      /  AST  31     /  ALT  69     /  AlkPhos  112    08 May 2022 08:09      Urinalysis Basic - ( 06 May 2022 21:47 )    Color: Yellow / Appearance: Slightly Turbid / S.025 / pH: x  Gluc: x / Ketone: Trace  / Bili: Negative / Urobili: Negative   Blood: x / Protein: 30 mg/dL / Nitrite: Negative   Leuk Esterase: Trace / RBC: 6-10 /HPF / WBC 3-5   Sq Epi: x / Non Sq Epi: Few / Bacteria: Few      RADIOLOGY & ADDITIONAL TESTS:    Personally reviewed.     Consultant(s) Notes Reviewed:  [x] YES  [ ] NO

## 2022-05-09 ENCOUNTER — TRANSCRIPTION ENCOUNTER (OUTPATIENT)
Age: 72
End: 2022-05-09

## 2022-05-09 VITALS
TEMPERATURE: 97 F | OXYGEN SATURATION: 97 % | DIASTOLIC BLOOD PRESSURE: 74 MMHG | HEART RATE: 71 BPM | SYSTOLIC BLOOD PRESSURE: 155 MMHG | RESPIRATION RATE: 16 BRPM

## 2022-05-09 LAB
ALBUMIN SERPL ELPH-MCNC: 2.6 G/DL — LOW (ref 3.3–5)
ALP SERPL-CCNC: 99 U/L — SIGNIFICANT CHANGE UP (ref 40–120)
ALT FLD-CCNC: 48 U/L — SIGNIFICANT CHANGE UP (ref 12–78)
ANION GAP SERPL CALC-SCNC: 6 MMOL/L — SIGNIFICANT CHANGE UP (ref 5–17)
AST SERPL-CCNC: 19 U/L — SIGNIFICANT CHANGE UP (ref 15–37)
BASOPHILS # BLD AUTO: 0.04 K/UL — SIGNIFICANT CHANGE UP (ref 0–0.2)
BASOPHILS NFR BLD AUTO: 0.4 % — SIGNIFICANT CHANGE UP (ref 0–2)
BILIRUB SERPL-MCNC: 1.6 MG/DL — HIGH (ref 0.2–1.2)
BUN SERPL-MCNC: 14 MG/DL — SIGNIFICANT CHANGE UP (ref 7–23)
CALCIUM SERPL-MCNC: 8.7 MG/DL — SIGNIFICANT CHANGE UP (ref 8.5–10.1)
CHLORIDE SERPL-SCNC: 109 MMOL/L — HIGH (ref 96–108)
CO2 SERPL-SCNC: 26 MMOL/L — SIGNIFICANT CHANGE UP (ref 22–31)
CREAT SERPL-MCNC: 0.91 MG/DL — SIGNIFICANT CHANGE UP (ref 0.5–1.3)
EGFR: 68 ML/MIN/1.73M2 — SIGNIFICANT CHANGE UP
EOSINOPHIL # BLD AUTO: 0.31 K/UL — SIGNIFICANT CHANGE UP (ref 0–0.5)
EOSINOPHIL NFR BLD AUTO: 2.8 % — SIGNIFICANT CHANGE UP (ref 0–6)
GLUCOSE SERPL-MCNC: 99 MG/DL — SIGNIFICANT CHANGE UP (ref 70–99)
HCT VFR BLD CALC: 39.5 % — SIGNIFICANT CHANGE UP (ref 34.5–45)
HGB BLD-MCNC: 13.2 G/DL — SIGNIFICANT CHANGE UP (ref 11.5–15.5)
IMM GRANULOCYTES NFR BLD AUTO: 0.3 % — SIGNIFICANT CHANGE UP (ref 0–1.5)
LYMPHOCYTES # BLD AUTO: 1.29 K/UL — SIGNIFICANT CHANGE UP (ref 1–3.3)
LYMPHOCYTES # BLD AUTO: 11.5 % — LOW (ref 13–44)
MCHC RBC-ENTMCNC: 30.7 PG — SIGNIFICANT CHANGE UP (ref 27–34)
MCHC RBC-ENTMCNC: 33.4 GM/DL — SIGNIFICANT CHANGE UP (ref 32–36)
MCV RBC AUTO: 91.9 FL — SIGNIFICANT CHANGE UP (ref 80–100)
MONOCYTES # BLD AUTO: 1.26 K/UL — HIGH (ref 0–0.9)
MONOCYTES NFR BLD AUTO: 11.2 % — SIGNIFICANT CHANGE UP (ref 2–14)
NEUTROPHILS # BLD AUTO: 8.31 K/UL — HIGH (ref 1.8–7.4)
NEUTROPHILS NFR BLD AUTO: 73.8 % — SIGNIFICANT CHANGE UP (ref 43–77)
NRBC # BLD: 0 /100 WBCS — SIGNIFICANT CHANGE UP (ref 0–0)
PLATELET # BLD AUTO: 171 K/UL — SIGNIFICANT CHANGE UP (ref 150–400)
POTASSIUM SERPL-MCNC: 3.4 MMOL/L — LOW (ref 3.5–5.3)
POTASSIUM SERPL-SCNC: 3.4 MMOL/L — LOW (ref 3.5–5.3)
PROT SERPL-MCNC: 6.1 G/DL — SIGNIFICANT CHANGE UP (ref 6–8.3)
RBC # BLD: 4.3 M/UL — SIGNIFICANT CHANGE UP (ref 3.8–5.2)
RBC # FLD: 12.7 % — SIGNIFICANT CHANGE UP (ref 10.3–14.5)
SODIUM SERPL-SCNC: 141 MMOL/L — SIGNIFICANT CHANGE UP (ref 135–145)
WBC # BLD: 11.24 K/UL — HIGH (ref 3.8–10.5)
WBC # FLD AUTO: 11.24 K/UL — HIGH (ref 3.8–10.5)

## 2022-05-09 PROCEDURE — 83690 ASSAY OF LIPASE: CPT

## 2022-05-09 PROCEDURE — 96376 TX/PRO/DX INJ SAME DRUG ADON: CPT

## 2022-05-09 PROCEDURE — 82248 BILIRUBIN DIRECT: CPT

## 2022-05-09 PROCEDURE — 80053 COMPREHEN METABOLIC PANEL: CPT

## 2022-05-09 PROCEDURE — 36415 COLL VENOUS BLD VENIPUNCTURE: CPT

## 2022-05-09 PROCEDURE — 76000 FLUOROSCOPY <1 HR PHYS/QHP: CPT

## 2022-05-09 PROCEDURE — 99285 EMERGENCY DEPT VISIT HI MDM: CPT

## 2022-05-09 PROCEDURE — 83605 ASSAY OF LACTIC ACID: CPT

## 2022-05-09 PROCEDURE — 81001 URINALYSIS AUTO W/SCOPE: CPT

## 2022-05-09 PROCEDURE — 96374 THER/PROPH/DIAG INJ IV PUSH: CPT

## 2022-05-09 PROCEDURE — 87086 URINE CULTURE/COLONY COUNT: CPT

## 2022-05-09 PROCEDURE — 99239 HOSP IP/OBS DSCHRG MGMT >30: CPT | Mod: GC

## 2022-05-09 PROCEDURE — 71045 X-RAY EXAM CHEST 1 VIEW: CPT

## 2022-05-09 PROCEDURE — 85025 COMPLETE CBC W/AUTO DIFF WBC: CPT

## 2022-05-09 PROCEDURE — 99232 SBSQ HOSP IP/OBS MODERATE 35: CPT

## 2022-05-09 PROCEDURE — 93005 ELECTROCARDIOGRAM TRACING: CPT

## 2022-05-09 PROCEDURE — 87635 SARS-COV-2 COVID-19 AMP PRB: CPT

## 2022-05-09 PROCEDURE — 96375 TX/PRO/DX INJ NEW DRUG ADDON: CPT

## 2022-05-09 PROCEDURE — 86803 HEPATITIS C AB TEST: CPT

## 2022-05-09 PROCEDURE — 74176 CT ABD & PELVIS W/O CONTRAST: CPT | Mod: MA

## 2022-05-09 PROCEDURE — C1769: CPT

## 2022-05-09 PROCEDURE — 99233 SBSQ HOSP IP/OBS HIGH 50: CPT

## 2022-05-09 DEVICE — AUTOTOME CANNULATING SPHINCTEROTOME RX 44 20MM: Type: IMPLANTABLE DEVICE | Status: FUNCTIONAL

## 2022-05-09 DEVICE — HYDRATOME 44: Type: IMPLANTABLE DEVICE | Status: FUNCTIONAL

## 2022-05-09 RX ORDER — HYDRALAZINE HCL 50 MG
50 TABLET ORAL EVERY 6 HOURS
Refills: 0 | Status: DISCONTINUED | OUTPATIENT
Start: 2022-05-09 | End: 2022-05-09

## 2022-05-09 RX ORDER — METOCLOPRAMIDE HCL 10 MG
5 TABLET ORAL ONCE
Refills: 0 | Status: DISCONTINUED | OUTPATIENT
Start: 2022-05-09 | End: 2022-05-09

## 2022-05-09 RX ORDER — HYDRALAZINE HCL 50 MG
1 TABLET ORAL
Qty: 0 | Refills: 0 | DISCHARGE
Start: 2022-05-09

## 2022-05-09 RX ORDER — HYDROMORPHONE HYDROCHLORIDE 2 MG/ML
0.5 INJECTION INTRAMUSCULAR; INTRAVENOUS; SUBCUTANEOUS
Refills: 0 | Status: DISCONTINUED | OUTPATIENT
Start: 2022-05-09 | End: 2022-05-09

## 2022-05-09 RX ORDER — HYDRALAZINE HCL 50 MG
1 TABLET ORAL
Qty: 120 | Refills: 0
Start: 2022-05-09 | End: 2022-06-07

## 2022-05-09 RX ORDER — SODIUM CHLORIDE 9 MG/ML
1000 INJECTION, SOLUTION INTRAVENOUS
Refills: 0 | Status: DISCONTINUED | OUTPATIENT
Start: 2022-05-09 | End: 2022-05-09

## 2022-05-09 RX ORDER — POTASSIUM CHLORIDE 20 MEQ
40 PACKET (EA) ORAL ONCE
Refills: 0 | Status: COMPLETED | OUTPATIENT
Start: 2022-05-09 | End: 2022-05-09

## 2022-05-09 RX ADMIN — Medication 50 MILLIGRAM(S): at 05:45

## 2022-05-09 RX ADMIN — Medication 40 MILLIEQUIVALENT(S): at 14:45

## 2022-05-09 RX ADMIN — ATENOLOL 25 MILLIGRAM(S): 25 TABLET ORAL at 05:45

## 2022-05-09 RX ADMIN — MEROPENEM 100 MILLIGRAM(S): 1 INJECTION INTRAVENOUS at 05:44

## 2022-05-09 RX ADMIN — ZOLPIDEM TARTRATE 5 MILLIGRAM(S): 10 TABLET ORAL at 01:11

## 2022-05-09 RX ADMIN — MEROPENEM 100 MILLIGRAM(S): 1 INJECTION INTRAVENOUS at 14:44

## 2022-05-09 RX ADMIN — SODIUM CHLORIDE 75 MILLILITER(S): 9 INJECTION, SOLUTION INTRAVENOUS at 12:24

## 2022-05-09 RX ADMIN — Medication 50 MILLIGRAM(S): at 14:44

## 2022-05-09 NOTE — DISCHARGE NOTE NURSING/CASE MANAGEMENT/SOCIAL WORK - PATIENT PORTAL LINK FT
You can access the FollowMyHealth Patient Portal offered by [facility] by registering at the following website: [email or web address]. By joining [facility]’s FollowMyHealth portal, you will also be able to view your health information using other applications (apps) compatible with our system.

## 2022-05-09 NOTE — DISCHARGE NOTE PROVIDER - PROVIDER TOKENS
PROVIDER:[TOKEN:[[id number]],FOLLOWUP:[1 week]],PROVIDER:[TOKEN:[[id number]],FOLLOWUP:[1 week]],PROVIDER:[TOKEN:[[id number]],FOLLOWUP:[1 week]]

## 2022-05-09 NOTE — PROGRESS NOTE ADULT - SUBJECTIVE AND OBJECTIVE BOX
GRANT RICHARD  MRN-[id number] 71y    GENERAL SURGERY/ DR. LORA    NO FEVER, CHILLS  NO N/V, TOLERATING CLEAR LIQUID DIET  NO ABDOMINAL PAIN   NO BM SINCE THIS PAST FRIDAY     MEDICATIONS  (STANDING):  ATENolol  Tablet 25 milliGRAM(s) Oral daily  dextrose 5% + sodium chloride 0.9%. 1000 milliLiter(s) (100 mL/Hr) IV Continuous <Continuous>  hydrALAZINE 50 milliGRAM(s) Oral every 8 hours  meropenem  IVPB 1000 milliGRAM(s) IV Intermittent every 8 hours    MEDICATIONS  (PRN):  ALPRAZolam 0.25 milliGRAM(s) Oral three times a day PRN anxiety  HYDROmorphone  Injectable 0.5 milliGRAM(s) IV Push every 6 hours PRN Severe Pain (7 - 10)  HYDROmorphone  Injectable 0.25 milliGRAM(s) IV Push every 6 hours PRN Moderate Pain (4 - 6)  ondansetron Injectable 4 milliGRAM(s) IV Push every 8 hours PRN Nausea and/or Vomiting  zolpidem 5 milliGRAM(s) Oral at bedtime PRN Insomnia  zolpidem 5 milliGRAM(s) Oral at bedtime PRN Insomnia     Vital Signs Last 24 Hrs  T(C): 37.2 (09 May 2022 05:01), Max: 37.4 (08 May 2022 12:18)  T(F): 98.9 (09 May 2022 05:01), Max: 99.4 (08 May 2022 12:18)  HR: 79 (09 May 2022 05:01) (68 - 83)  BP: 150/67 (09 May 2022 05:01) (148/70 - 185/92)  RR: 17 (09 May 2022 05:01) (16 - 17)  SpO2: 95% (09 May 2022 05:01) (94% - 96%)    05-08-22 @ 07:01  -  05-09-22 @ 07:00  --------------------------------------------------------  IN: 150 mL / OUT: 0 mL / NET: 150 mL     LUNGS: CLEAR TO AUSCULTATION , NO W/R/R  ABDOMEN: HEALING WOUND/ TROCAR SITE X3, OLD LONG MIDLINE SACR, + BS,                        SOFT, NON DISTENDED, SOME INCISIONAL TENDERNESS   EXTREMITY: NO EDEMA, NO CALF TENDERNESS                             14.3   12.68 )-----------( 177      ( 08 May 2022 08:09 )             42.8      05-08    140  |  108  |  11  ----------------------------<  111<H>  3.5   |  23  |  0.74    Ca    8.9      08 May 2022 08:09    TPro  6.3  /  Alb  2.9<L>  /  TBili  1.5<H>  /  DBili  x   /  AST  31  /  ALT  69  /  AlkPhos  112  05-08                          ASSESSMENT &  PLAN:     CHOLECYSTITIS,  CHOLELITHIASIS   PANCREATITIS, CONCERN FOR NECROTIZING PANCREATITIS ON CT/ POSSIBLE PANCREATIC MASS / RECURRENT CARCINOMATOSIS    S/P DIAGNOSTIC LAPAROTOMY , SAYRA 04/21/2022    NPO FOR NOW FOR ERCP   ID FOLLOW UP NOTED, STARTED ON MEROPENEM PROPHYLACTICALLY   GI FOLLOW UP NOTED, PLAN FOR ERCP LATER TODAY  NO SURGICAL INTERVENTION AT [facility]   WILL CONTINUE TO FOLLOW UP

## 2022-05-09 NOTE — PROGRESS NOTE ADULT - SUBJECTIVE AND OBJECTIVE BOX
[facility]  INFECTIOUS DISEASES   [address] [place] [address]  Tel: [phone]     Fax: [phone]  ======================================================  MD Laina Thorne Kaushal, MD Cho, Michelle, MD   =======================================================    GRANT RICHARD [id number]    Follow up: Pancreatitis and cholecystitis     No abdominal pain, no vomiting or diarrhea, no fever.   Imaging consistent with possible necrotizing pancreatitis 2' to cholecystitis, so awaiting ERCP.     Allergies:  adhesives (Other)  dairy products (Other)  SEASONAL ALLERGIES - Post Nasal Drip (Other)  Sulfur (Rash)    Antibiotics:  ALPRAZolam 0.25 milliGRAM(s) Oral three times a day PRN  ATENolol  Tablet 25 milliGRAM(s) Oral daily  dextrose 5% + sodium chloride 0.9%. 1000 milliLiter(s) IV Continuous <Continuous>  hydrALAZINE 50 milliGRAM(s) Oral every 6 hours  HYDROmorphone  Injectable 0.5 milliGRAM(s) IV Push every 6 hours PRN  HYDROmorphone  Injectable 0.25 milliGRAM(s) IV Push every 6 hours PRN  meropenem  IVPB 1000 milliGRAM(s) IV Intermittent every 8 hours  ondansetron Injectable 4 milliGRAM(s) IV Push every 8 hours PRN  potassium chloride    Tablet ER 40 milliEquivalent(s) Oral once  zolpidem 5 milliGRAM(s) Oral at bedtime PRN  zolpidem 5 milliGRAM(s) Oral at bedtime PRN     REVIEW OF SYSTEMS:  CONSTITUTIONAL:  No Fever or chills  HEENT:   No diplopia or blurred vision.  No earache, sore throat or runny nose.  CARDIOVASCULAR:  No chest pain or SOB  RESPIRATORY:  No cough, shortness of breath, PND or orthopnea.  GASTROINTESTINAL:  No nausea, vomiting or diarrhea.  GENITOURINARY:  No dysuria, frequency or urgency. No Blood in urine  MUSCULOSKELETAL:  no joint aches, no muscle pain  SKIN:  No change in skin, hair or nails.  NEUROLOGIC:  No paresthesias or weakness.  PSYCHIATRIC:  No disorder of thought or mood.  ENDOCRINE:  No heat or cold intolerance, polyuria or polydipsia.  HEMATOLOGICAL:  No easy bruising or bleeding.      Physical Exam:  ICU Vital Signs Last 24 Hrs  T(C): 36.7 (09 May 2022 10:36), Max: 37.4 (08 May 2022 12:18)  T(F): 98 (09 May 2022 10:36), Max: 99.4 (08 May 2022 12:18)  HR: 75 (09 May 2022 10:36) (68 - 83)  BP: 160/66 (09 May 2022 10:36) (148/70 - 185/92)  RR: 16 (09 May 2022 10:36) (16 - 17)  SpO2: 96% (09 May 2022 10:36) (94% - 96%)  GEN: NAD  HEENT: normocephalic and atraumatic. EOMI. ABELARDO.    NECK: Supple.  No lymphadenopathy   LUNGS: Clear to auscultation.  HEART: Regular rate and rhythm without murmur.  ABDOMEN: Soft, nontender, and nondistended.  Positive bowel sounds.    : No CVA tenderness  EXTREMITIES: Without any cyanosis, clubbing, rash, lesions or edema.  MSK: no joint swelling  NEUROLOGIC: grossly intact.  PSYCHIATRIC: Appropriate affect .  SKIN: No ulceration or induration present.      Labs:  05-09    141  |  109<H>  |  14  ----------------------------<  99  3.4<L>   |  26  |  0.91    Ca    8.7      09 May 2022 07:41    TPro  6.1  /  Alb  2.6<L>  /  TBili  1.6<H>  /  DBili  x   /  AST  19  /  ALT  48  /  AlkPhos  99  05-09                        13.2   11.24 )-----------( 171      ( 09 May 2022 07:41 )             39.5     LIVER FUNCTIONS - ( 09 May 2022 07:41 )  Alb: 2.6 g/dL / Pro: 6.1 g/dL / ALK PHOS: 99 U/L / ALT: 48 U/L / AST: 19 U/L / GGT: x           RECENT CULTURES:  05-07 @ 00:30 Clean Catch Clean Catch (Midstream)     <10,000 CFU/mL Normal Urogenital Amy    All imaging and data are reviewed.   < from: CT Abdomen and Pelvis No Cont (05.06.22 @ 23:15) >  IMPRESSION:  Distended gallbladder containing gallstones with mild gallbladder wall   edema, likely in keeping with reported acute cholecystitis. No CT   findings of perforation at this time.  Diffuse markedly edematous pancreas with peripancreatic stranding and   fluid, compatible with acute pancreatitis.  Redemonstrated incompletely characterized opacity measuring 5 x 4.1 cm at   the level of the head/uncinate process of the pancreas. Again this may   represent isolated peripancreatic necrotizing pancreatitis. However,   given patient's history of peritoneal carcinomatosis and ovarian   neoplasm, recurrent peritoneal carcinomatosis remains in the   differential. Correlate with tumor markers.  Thickened proximal duodenal folds with adjacent to the inflamed pancreas,   likely reactive duodenitis.  Mild ascites.    Assessment and Plan:   70 yo woman with PMH of HTN, anxiety, migraines, ovarian ca s/p hysterectomy, biliary colic was admitted with abdominal pain and emesis.   WBC 11.36, AST//177, Lipase >30,000.   CT showed Re-demonstrated incompletely characterized opacity measuring 5 x 4.1 cm at the level of the head/uncinate process of the pancreas. Again this may represent isolated peripancreatic necrotizing pancreatitis.  She has been started on meropenem. Covering abdominal amy GNR and Anaerobes, since started on meropenem can continue the same.   Today feels better, no GI symptoms or abdominal pain, NPO awaiting ERCP.     Necrotizing Pancreatitis due to cholecystitis?   - Continue Meropenem 1g q8hrs  - Send blood cultures if febrile   - Follow LFTs, Lipase   - Awaiting ERCP later today    Will follow.     Marcelino Choi MD  Division of Infectious Diseases   Please call ID service at [phone] with any question.      35 minutes spent on total encounter assessing patient, examination, chart review, counseling and coordinating care by the attending physician/nurse/care manager.   [facility]  INFECTIOUS DISEASES   [address] [place] [address]  Tel: [phone]     Fax: [phone]  ======================================================  MD Laina Thorne Kaushal, MD Cho, Michelle, MD   =======================================================    GRANT RICHARD [id number]    Follow up: Pancreatitis and cholecystitis     No abdominal pain, no vomiting or diarrhea, no fever.   Imaging consistent with possible necrotizing pancreatitis 2' to cholecystitis, so awaiting ERCP.     Allergies:  adhesives (Other)  dairy products (Other)  SEASONAL ALLERGIES - Post Nasal Drip (Other)  Sulfur (Rash)    Antibiotics:  ALPRAZolam 0.25 milliGRAM(s) Oral three times a day PRN  ATENolol  Tablet 25 milliGRAM(s) Oral daily  dextrose 5% + sodium chloride 0.9%. 1000 milliLiter(s) IV Continuous <Continuous>  hydrALAZINE 50 milliGRAM(s) Oral every 6 hours  HYDROmorphone  Injectable 0.5 milliGRAM(s) IV Push every 6 hours PRN  HYDROmorphone  Injectable 0.25 milliGRAM(s) IV Push every 6 hours PRN  meropenem  IVPB 1000 milliGRAM(s) IV Intermittent every 8 hours  ondansetron Injectable 4 milliGRAM(s) IV Push every 8 hours PRN  potassium chloride    Tablet ER 40 milliEquivalent(s) Oral once  zolpidem 5 milliGRAM(s) Oral at bedtime PRN  zolpidem 5 milliGRAM(s) Oral at bedtime PRN     REVIEW OF SYSTEMS:  CONSTITUTIONAL:  No Fever or chills  HEENT:   No diplopia or blurred vision.  No earache, sore throat or runny nose.  CARDIOVASCULAR:  No chest pain or SOB  RESPIRATORY:  No cough, shortness of breath, PND or orthopnea.  GASTROINTESTINAL:  No nausea, vomiting or diarrhea.  GENITOURINARY:  No dysuria, frequency or urgency. No Blood in urine  MUSCULOSKELETAL:  no joint aches, no muscle pain  SKIN:  No change in skin, hair or nails.  NEUROLOGIC:  No paresthesias or weakness.  PSYCHIATRIC:  No disorder of thought or mood.  ENDOCRINE:  No heat or cold intolerance, polyuria or polydipsia.  HEMATOLOGICAL:  No easy bruising or bleeding.      Physical Exam:  ICU Vital Signs Last 24 Hrs  T(C): 36.7 (09 May 2022 10:36), Max: 37.4 (08 May 2022 12:18)  T(F): 98 (09 May 2022 10:36), Max: 99.4 (08 May 2022 12:18)  HR: 75 (09 May 2022 10:36) (68 - 83)  BP: 160/66 (09 May 2022 10:36) (148/70 - 185/92)  RR: 16 (09 May 2022 10:36) (16 - 17)  SpO2: 96% (09 May 2022 10:36) (94% - 96%)  GEN: NAD  HEENT: normocephalic and atraumatic. EOMI. ABELARDO.    NECK: Supple.  No lymphadenopathy   LUNGS: Clear to auscultation.  HEART: Regular rate and rhythm without murmur.  ABDOMEN: Soft, nontender, and nondistended.  Positive bowel sounds.    : No CVA tenderness  EXTREMITIES: Without any cyanosis, clubbing, rash, lesions or edema.  MSK: no joint swelling  NEUROLOGIC: grossly intact.  PSYCHIATRIC: Appropriate affect .  SKIN: No ulceration or induration present.      Labs:  05-09    141  |  109<H>  |  14  ----------------------------<  99  3.4<L>   |  26  |  0.91    Ca    8.7      09 May 2022 07:41    TPro  6.1  /  Alb  2.6<L>  /  TBili  1.6<H>  /  DBili  x   /  AST  19  /  ALT  48  /  AlkPhos  99  05-09                        13.2   11.24 )-----------( 171      ( 09 May 2022 07:41 )             39.5     LIVER FUNCTIONS - ( 09 May 2022 07:41 )  Alb: 2.6 g/dL / Pro: 6.1 g/dL / ALK PHOS: 99 U/L / ALT: 48 U/L / AST: 19 U/L / GGT: x           RECENT CULTURES:  05-07 @ 00:30 Clean Catch Clean Catch (Midstream)     <10,000 CFU/mL Normal Urogenital Amy    All imaging and data are reviewed.   < from: CT Abdomen and Pelvis No Cont (05.06.22 @ 23:15) >  IMPRESSION:  Distended gallbladder containing gallstones with mild gallbladder wall   edema, likely in keeping with reported acute cholecystitis. No CT   findings of perforation at this time.  Diffuse markedly edematous pancreas with peripancreatic stranding and   fluid, compatible with acute pancreatitis.  Redemonstrated incompletely characterized opacity measuring 5 x 4.1 cm at   the level of the head/uncinate process of the pancreas. Again this may   represent isolated peripancreatic necrotizing pancreatitis. However,   given patient's history of peritoneal carcinomatosis and ovarian   neoplasm, recurrent peritoneal carcinomatosis remains in the   differential. Correlate with tumor markers.  Thickened proximal duodenal folds with adjacent to the inflamed pancreas,   likely reactive duodenitis.  Mild ascites.    Assessment and Plan:   70 yo woman with PMH of HTN, anxiety, migraines, ovarian ca s/p hysterectomy, biliary colic was admitted with abdominal pain and emesis.   WBC 11.36, AST//177, Lipase >30,000.   CT showed Re-demonstrated incompletely characterized opacity measuring 5 x 4.1 cm at the level of the head/uncinate process of the pancreas. Again this may represent isolated peripancreatic necrotizing pancreatitis.  She has been started on meropenem. Covering abdominal amy GNR and Anaerobes.   Today feels better, no GI symptoms or abdominal pain, was NPO for ERCP.   Unable to perform the test, so GI recommended to stop ABx, and perform outpatient EUS.   She could have passed a stone causing pancreatitis vs peritoneal carcinomatosis?     Necrotizing Pancreatitis due to cholecystitis?   - Can stop Meropenem  - Watch off antibitoics  - Advance diet as tolerated   - Follow LFTs, Lipase   - GI follow up as outpatient   - If symptoms recurs will need to restart Abx.     Will follow PRN.  Discussed with Dr. Benson.      Marcelino Choi MD  Division of Infectious Diseases   Please call ID service at [phone] with any question.      35 minutes spent on total encounter assessing patient, examination, chart review, counseling and coordinating care by the attending physician/nurse/care manager.

## 2022-05-09 NOTE — DISCHARGE NOTE NURSING/CASE MANAGEMENT/SOCIAL WORK - NSDCPEFALRISK_GEN_ALL_CORE
For information on Fall & Injury Prevention, visit: [email or web address] OR  [email or web address] OR  https://www.cdc.gov/steadi/patient.html

## 2022-05-09 NOTE — DISCHARGE NOTE PROVIDER - NSDCCPCAREPLAN_GEN_ALL_CORE_FT
PRINCIPAL DISCHARGE DIAGNOSIS  Diagnosis: Continuous severe abdominal pain  Assessment and Plan of Treatment: you were admitted for severe abdominal pain. Imaging revealed that this may be gallstone pancreatitis. GI attempted an ERCP procedure on you but it was unsuccessful.  Please follow up with GI within 1 week of discharge.  Please follow up with PCP within 1 week of discharge  Please follow up with Surgery within 1 week of discharge      SECONDARY DISCHARGE DIAGNOSES  Diagnosis: Hypertension  Assessment and Plan of Treatment: You have hypertension, it was poorly controlled in the hospital and you were started on hydralazine 50 mg every 6 hours.  Please continue to take your atenolol 25 mg daily and start taking your hydralazine 50 mg every 6 hours.     PRINCIPAL DISCHARGE DIAGNOSIS  Diagnosis: Continuous severe abdominal pain  Assessment and Plan of Treatment: you were admitted for severe abdominal pain. Imaging revealed that this may be gallstone pancreatitis. GI attempted an ERCP procedure on you but it was unsuccessful.  Please follow up with GI within 1 week of discharge.  Please follow up with PCP within 2-3 days of  of discharge for blood pressure check and medication adjutment if needed to.   Please follow up with Surgery within 1 week of discharge      SECONDARY DISCHARGE DIAGNOSES  Diagnosis: Hypertension  Assessment and Plan of Treatment: You have hypertension, it was poorly controlled in the hospital and you were started on hydralazine 50 mg every 6 hours.  Please continue to take your atenolol 25 mg daily and start taking your hydralazine 50 mg every 6 hours.

## 2022-05-09 NOTE — PROGRESS NOTE ADULT - NS ATTEND AMEND GEN_ALL_CORE FT
Chart reviewed    Patient seen and examined    Review of plan as outlined    72 yo F with a PMHx of HTN, HLD, anxiety, migraines, ovarian ca s/p hysterectomy, biliary colic who presents with abdominal pain and emesis, gallstone pancreatitis, for ERCP tomorrow- Optimized for the OR from a cardiac point of view with no evidence of active ischemic heart disease, decompensated heart failure, severe obstructive valvular disease, or uncontrolled arrhythmia.    seen by surgery plan for ERCP  - Optimized for the OR from a cardiac point of view with no evidence of active ischemic heart disease, decompensated heart failure, severe obstructive valvular disease, or uncontrolled arrhythmia.  -continue atenolol and hydralazine   - Monitor and replete lytes, supplement potassium 3.4 today

## 2022-05-09 NOTE — DISCHARGE NOTE PROVIDER - HOSPITAL COURSE
FROM ADMISSION H+P:   HPI:  Pt is a 70 yo F with a PMHx of HTN, HLD, anxiety, migraines, ovarian ca s/p hysterectomy, biliary colic who presents with abdominal pain and emesis. Pt says this evening at around 5 pm she felt sudden onset abdominal pain. Says she has had similar pain from prior gallbladder attacks, but not quite as severe. Said the pain was constant, located in the epigastric region and radiating to her back between her should blades. She used warm compresses for the pain without relief, which prompted her to come to the ER for further evaluation. She also states she had multiple episodes of emesis prior to arrival.     In the ED, labwork revealing WBC 11.36, AST//177, Lipase >30,000. She received IV Dilaudid x2, zofran x 2 and 2 L NS boluses. At the of my examination, pt says pain is improved, now 3/10. Said nausea resolved. Denies fevers, chills, chest pain, SOB, melena or hematochezia. Of note, pt admitted 2 weeks ago for elective lap dmitriy due to biliary pain. Procedure was unsuccessful due to obstructive adhesions. Was scheduled for ERCP this Monday with Dr. Bhardwaj  (07 May 2022 00:42)      ---  HOSPITAL COURSE/PERTINENT LABS/PROCEDURES PERFORMED/PENDING TESTS: Patient admitted for gallstone pancreatitis. Infectious disease recommended antibiotic coverage with meropenem. Patient was kept NPO for ERCP with GI. ERCP was attempted but unsuccessful. They recommended discharge with outpatient followup for EUS. Pain was adequately controlled and improved with medical management. Blood pressure was elevated on admission, likely due to pain and home hydralazine was increased to 50 mg q6h. Diet was slowly advanced.    Patient continued to improve and is medically stabilized for discharge.    ---  PATIENT CONDITION:  - stable    ---  PHYSICAL EXAM ON DAY OF DISCHARGE:  VITALS:   T(C): 36.6 (05-09-22 @ 12:28), Max: 37.2 (05-09-22 @ 05:01)  HR: 67 (05-09-22 @ 12:28) (60 - 83)  BP: 136/61 (05-09-22 @ 12:28) (136/61 - 164/83)  RR: 14 (05-09-22 @ 12:28) (12 - 17)  SpO2: 96% (05-09-22 @ 12:28) (94% - 99%)    GENERAL: NAD, lying in bed comfortably  HEAD:  Atraumatic, normocephalic  EYES: EOMI, PERRLA, conjunctiva and sclera clear  ENT: Moist mucous membranes  NECK: Supple, no JVD  HEART: Regular rate and rhythm, no murmurs, rubs, or gallops  LUNGS: Unlabored respirations.  Clear to auscultation bilaterally, no crackles, wheezing, or rhonchi  ABDOMEN: Soft, nontender, nondistended, +BS  EXTREMITIES: 2+ peripheral pulses bilaterally. No clubbing, cyanosis, or edema  NERVOUS SYSTEM:  A&Ox3  SKIN: warm and well perfused  ---  CONSULTANTS:   GI- Dr. Dee  ID- Dr. Choi  Surgery- Dr. Damico  ---  ADVANCED CARE PLANNING:  - Code status:      - MOLST completed:      [  ] NO     [  ] YES    ---  TIME SPENT:  I, the attending physician, was physically present for the key portions of the evaluation and management (E/M) service provided. The total amount of time spent reviewing the hospital notes, laboratory values, imaging findings, assessing/counseling the patient, discussing with consultant physicians, social work, nursing staff was -- minutes FROM ADMISSION H+P:   HPI:  Pt is a 72 yo F with a PMHx of HTN, HLD, anxiety, migraines, ovarian ca s/p hysterectomy, biliary colic who presents with abdominal pain and emesis. Pt says this evening at around 5 pm she felt sudden onset abdominal pain. Says she has had similar pain from prior gallbladder attacks, but not quite as severe. Said the pain was constant, located in the epigastric region and radiating to her back between her should blades. She used warm compresses for the pain without relief, which prompted her to come to the ER for further evaluation. She also states she had multiple episodes of emesis prior to arrival.     In the ED, labwork revealing WBC 11.36, AST//177, Lipase >30,000. She received IV Dilaudid x2, zofran x 2 and 2 L NS boluses. At the of my examination, pt says pain is improved, now 3/10. Said nausea resolved. Denies fevers, chills, chest pain, SOB, melena or hematochezia. Of note, pt admitted 2 weeks ago for elective lap dmitriy due to biliary pain. Procedure was unsuccessful due to obstructive adhesions. Was scheduled for ERCP this Monday with Dr. Bhardwaj  (07 May 2022 00:42)      ---  HOSPITAL COURSE/PERTINENT LABS/PROCEDURES PERFORMED/PENDING TESTS: Patient admitted for gallstone pancreatitis. Infectious disease recommended antibiotic coverage with meropenem. Patient was kept NPO for ERCP with GI. ERCP was attempted but unsuccessful. They recommended discharge with outpatient followup for EUS. Pain was adequately controlled and improved with medical management. Blood pressure was elevated on admission, likely due to pain and home hydralazine was increased to 50 mg q6h. Diet was slowly advanced.    Patient continued to improve and is medically stabilized for discharge. Cleared by ID and GI for discharge off antibiotics.     ---  PATIENT CONDITION:  - stable    ---  PHYSICAL EXAM ON DAY OF DISCHARGE:  VITALS:   T(C): 36.6 (05-09-22 @ 12:28), Max: 37.2 (05-09-22 @ 05:01)  HR: 67 (05-09-22 @ 12:28) (60 - 83)  BP: 136/61 (05-09-22 @ 12:28) (136/61 - 164/83)  RR: 14 (05-09-22 @ 12:28) (12 - 17)  SpO2: 96% (05-09-22 @ 12:28) (94% - 99%)    GENERAL: NAD, lying in bed comfortably  HEAD:  Atraumatic, normocephalic  EYES: EOMI, PERRLA, conjunctiva and sclera clear  ENT: Moist mucous membranes  NECK: Supple, no JVD  HEART: Regular rate and rhythm, no murmurs, rubs, or gallops  LUNGS: Unlabored respirations.  Clear to auscultation bilaterally, no crackles, wheezing, or rhonchi  ABDOMEN: Soft, nontender, nondistended, +BS  EXTREMITIES: 2+ peripheral pulses bilaterally. No clubbing, cyanosis, or edema  NERVOUS SYSTEM:  A&Ox3  SKIN: warm and well perfused  ---  CONSULTANTS:   GI- Dr. Leila CHRISTINE- Dr. Choi  Surgery- Dr. Damico  ---  ADVANCED CARE PLANNING:  - Code status:      - MOLST completed:      [  ] NO     [  ] YES    ---  TIME SPENT:  I, the attending physician, was physically present for the key portions of the evaluation and management (E/M) service provided. The total amount of time spent reviewing the hospital notes, laboratory values, imaging findings, assessing/counseling the patient, discussing with consultant physicians, social work, nursing staff was 38  minutes

## 2022-05-09 NOTE — DISCHARGE NOTE PROVIDER - NSDCMRMEDTOKEN_GEN_ALL_CORE_FT
Ambien 10 mg oral tablet: 1 tab(s) orally once a day (at bedtime), As Needed  atenolol 25 mg oral tablet: 1 tab(s) orally once a day  hydrALAZINE 50 mg oral tablet: 1 tab(s) orally every 6 hours  ROSUVASTATIN CALCIUM 5 MG TAB: TAKE 1 TABLET BY MOUTH EVERY DAY  Xanax 0.25 mg oral tablet: 1 tab orally 3 times a day, As Needed

## 2022-05-09 NOTE — PROGRESS NOTE ADULT - ASSESSMENT
72 yo F with a PMHx of HTN, HLD, anxiety, migraines, ovarian ca s/p hysterectomy, biliary colic who presents with abdominal pain and emesis, gallstone pancreatitis, for ERCP tomorrow- Optimized for the OR from a cardiac point of view with no evidence of active ischemic heart disease, decompensated heart failure, severe obstructive valvular disease, or uncontrolled arrhythmia.    seen by surgery plan for ERCP  - Optimized for the OR from a cardiac point of view with no evidence of active ischemic heart disease, decompensated heart failure, severe obstructive valvular disease, or uncontrolled arrhythmia.  -continue atenolol and hydralazine   - Monitor and replete lytes, supplement potassium 3.4 today     Jenny Vera FNP-C  Cardiology NP  [facility] [phone]    
72 y/o F with complex past medical/surgical history, including diag lap with aborted cholecystectomy 2/2 obstructive adhesions (4/21/22, Dr. Najera), admitted with labs consistent with pancreatitis and imaging showing inflammatory mass/changes to head of pancreas with pericholecystic fluid/edema and several small stones
pancreatitis  abdominal pain  abnormal CT    pain clinically improved  ? gallstone versus alcohol  clear liquids  iv fluid  pain control  will tentatively keep on schedule for ercp on monday pm  d/w patient and     I reviewed the overnight course of events on the unit, re-confirming the patient history. I discussed the care with the patient and their family  The plan of care was discussed with the physician assistant and modifications were made to the notation where appropriate.   Differential diagnosis and plan of care discussed with patient after the evaluation  35 minutes spent on total encounter of which more than fifty percent of the encounter was spent counseling and/or coordinating care by the attending physician.  Advanced care planning was discussed with patient and family.  Advanced care planning forms were reviewed and discussed.  Risks, benefits and alternatives of gastroenterologic procedures were discussed in detail and all questions were answered.  
Pt is a 70 yo F with a PMHx of HTN, HLD, anxiety, migraines, ovarian ca s/p hysterectomy, hx of peritoneal carcinomatosis, biliary colic who presents with abdominal pain and emesis. Admit for gallstone pancreatitis, acute dmitriy and pancreatitic mass.
Pt is a 70 yo F with a PMHx of HTN, HLD, anxiety, migraines, ovarian ca s/p hysterectomy, hx of peritoneal carcinomatosis, biliary colic who presents with abdominal pain and emesis. Admit for gallstone pancreatitis, acute dmitriy and pancreatitic mass.

## 2022-05-09 NOTE — DISCHARGE NOTE NURSING/CASE MANAGEMENT/SOCIAL WORK - NSTRANSFERBELONGINGSRESP_GEN_A_NUR
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 She is eating partial meals, weak, on large dose basal bolus insulin.   CAD: S/P cabg On medications, stable, monitored.  Gout: ankle pain has much improved s/p colchiceine regimen as per rheum recommendations  Hypothyroidism: synthroid 50 mcg po daily, asymptomatic.  HTN: Controlled, On med.  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 She is eating partial meals, weak, on large dose basal bolus insulin.   CAD: S/P cabg On medications, stable, monitored.  Gout: ankle pain has much improved s/p colchiceine regimen as per rheum recommendations  Hypothyroidism: synthroid 50 mcg po daily, asymptomatic.  HTN: Controlled, On med.  HLD; on statin  CKD: Monitor labs/BMP

## 2022-05-09 NOTE — DISCHARGE NOTE PROVIDER - CARE PROVIDER_API CALL
Edwin Bhardwaj ()  Internal Medicine  [address] [place] [address]  Phone: [phone]  Fax: [phone]  Follow Up Time: 1 week    Toi Marshall  [facility]  Internal Medicine, [address], [address]  [place] [address]  Phone: [phone]  Fax: [phone]  Follow Up Time: 1 week    Daryl Najera)  Surgery  [address] [place] [address]  Phone: [phone]  Fax: [phone]  Follow Up Time: 1 week

## 2022-05-09 NOTE — DISCHARGE NOTE PROVIDER - CARE PROVIDERS DIRECT ADDRESSES
,DirectAddress_Unknown,DirectAddress_Unknown,[email or web address]

## 2022-05-09 NOTE — PROGRESS NOTE ADULT - SUBJECTIVE AND OBJECTIVE BOX
[facility] -- Usha Mckeon, Goran, Andrea, Christian Higgins Savella  Office # [phone]      Follow Up:    htn hld pre procedure cardiac evaluation   Subjective/Observations:   No events overnight resting comfortably in bed.  No complaints of chest pain, dyspnea, or palpitations reported. No signs of orthopnea or PND.      REVIEW OF SYSTEMS: All other review of systems is negative unless indicated above    PAST MEDICAL & SURGICAL HISTORY:  Ovarian malignant neoplasm  DX 2012    H/O: HTN (hypertension)    MVP (mitral valve prolapse)    Sciatica    Shoulder joint pain  right shoulder pain    Peritoneal carcinomatosis    Hypercholesterolemia    Hay fever    Seasonal allergies    Migraines    Chronic cholecystitis    Calculus of bile duct with cholangitis, unspecified, without obstruction    H/O colonoscopy    S/P left oophorectomy    S/P right oophorectomy    S/P ROSALIA (total abdominal hysterectomy)  (2012)    History of colon resection  (2012)        MEDICATIONS  (STANDING):  ATENolol  Tablet 25 milliGRAM(s) Oral daily  dextrose 5% + sodium chloride 0.9%. 1000 milliLiter(s) (100 mL/Hr) IV Continuous <Continuous>  hydrALAZINE 50 milliGRAM(s) Oral every 6 hours  meropenem  IVPB 1000 milliGRAM(s) IV Intermittent every 8 hours  potassium chloride    Tablet ER 40 milliEquivalent(s) Oral once    MEDICATIONS  (PRN):  ALPRAZolam 0.25 milliGRAM(s) Oral three times a day PRN anxiety  HYDROmorphone  Injectable 0.5 milliGRAM(s) IV Push every 6 hours PRN Severe Pain (7 - 10)  HYDROmorphone  Injectable 0.25 milliGRAM(s) IV Push every 6 hours PRN Moderate Pain (4 - 6)  ondansetron Injectable 4 milliGRAM(s) IV Push every 8 hours PRN Nausea and/or Vomiting  zolpidem 5 milliGRAM(s) Oral at bedtime PRN Insomnia  zolpidem 5 milliGRAM(s) Oral at bedtime PRN Insomnia      Allergies    adhesives (Other)  dairy products (Other)  SEASONAL ALLERGIES - Post Nasal Drip (Other)  Sulfur (Rash)    Intolerances        Vital Signs Last 24 Hrs  T(C): 37.2 (09 May 2022 05:01), Max: 37.4 (08 May 2022 12:18)  T(F): 98.9 (09 May 2022 05:01), Max: 99.4 (08 May 2022 12:18)  HR: 79 (09 May 2022 05:01) (68 - 83)  BP: 150/67 (09 May 2022 05:01) (148/70 - 185/92)  BP(mean): --  RR: 17 (09 May 2022 05:01) (16 - 17)  SpO2: 95% (09 May 2022 05:01) (94% - 96%)    I&O's Summary    08 May 2022 07:01  -  09 May 2022 07:00  --------------------------------------------------------  IN: 150 mL / OUT: 0 mL / NET: 150 mL          PHYSICAL EXAM:  TELE: not on tele   Constitutional: NAD, awake and alert, well-developed  HEENT: Moist Mucous Membranes, Anicteric  Pulmonary: Non-labored, breath sounds are clear bilaterally, No wheezing, crackles or rhonchi  Cardiovascular: Regular, S1 and S2 nl, No murmurs, rubs, gallops or clicks  Gastrointestinal: Bowel Sounds present, soft, nontender.   Lymph: No lymphadenopathy. No peripheral edema.  Skin: No visible rashes or ulcers.  Psych:  Mood & affect appropriate    LABS: All Labs Reviewed:                        13.2   11.24 )-----------( 171      ( 09 May 2022 07:41 )             39.5                         14.3   12.68 )-----------( 177      ( 08 May 2022 08:09 )             42.8                         14.3   8.76  )-----------( 202      ( 07 May 2022 08:30 )             43.3     09 May 2022 07:41    141    |  109    |  14     ----------------------------<  99     3.4     |  26     |  0.91   08 May 2022 08:09    140    |  108    |  11     ----------------------------<  111    3.5     |  23     |  0.74   07 May 2022 08:30    142    |  111    |  21     ----------------------------<  104    4.5     |  27     |  1.00     Ca    8.7        09 May 2022 07:41  Ca    8.9        08 May 2022 08:09  Ca    8.5        07 May 2022 08:30    TPro  6.1    /  Alb  2.6    /  TBili  1.6    /  DBili  x      /  AST  19     /  ALT  48     /  AlkPhos  99     09 May 2022 07:41  TPro  6.3    /  Alb  2.9    /  TBili  1.5    /  DBili  x      /  AST  31     /  ALT  69     /  AlkPhos  112    08 May 2022 08:09  TPro  5.9    /  Alb  2.7    /  TBili  1.0    /  DBili  0.2    /  AST  77     /  ALT  110    /  AlkPhos  129    07 May 2022 08:30            EKG:  NSR.  NO acute or ischemic ST or T wave changes.

## 2022-05-12 ENCOUNTER — APPOINTMENT (OUTPATIENT)
Dept: SURGERY | Facility: CLINIC | Age: 72
End: 2022-05-12
Payer: MEDICARE

## 2022-05-12 VITALS
BODY MASS INDEX: 21.82 KG/M2 | DIASTOLIC BLOOD PRESSURE: 73 MMHG | HEART RATE: 68 BPM | OXYGEN SATURATION: 97 % | SYSTOLIC BLOOD PRESSURE: 136 MMHG | WEIGHT: 144 LBS | HEIGHT: 68 IN

## 2022-05-12 PROCEDURE — 99213 OFFICE O/P EST LOW 20 MIN: CPT

## 2022-05-13 ENCOUNTER — APPOINTMENT (OUTPATIENT)
Dept: HEMATOLOGY ONCOLOGY | Facility: CLINIC | Age: 72
End: 2022-05-13

## 2022-05-25 ENCOUNTER — NON-APPOINTMENT (OUTPATIENT)
Age: 72
End: 2022-05-25

## 2022-05-25 DIAGNOSIS — K86.9 DISEASE OF PANCREAS, UNSPECIFIED: ICD-10-CM

## 2022-05-25 DIAGNOSIS — Z01.812 ENCOUNTER FOR PREPROCEDURAL LABORATORY EXAMINATION: ICD-10-CM

## 2022-05-27 NOTE — ASU PATIENT PROFILE, ADULT - FALL HARM RISK - FALL HARM RISK
No indicators present Pt c/o of diffuse muscle and joint pain with fatigue Pt hx occasional neck and lower back pain

## 2022-05-31 ENCOUNTER — APPOINTMENT (OUTPATIENT)
Dept: GASTROENTEROLOGY | Facility: HOSPITAL | Age: 72
End: 2022-05-31

## 2022-05-31 ENCOUNTER — RESULT REVIEW (OUTPATIENT)
Age: 72
End: 2022-05-31

## 2022-05-31 ENCOUNTER — OUTPATIENT (OUTPATIENT)
Dept: OUTPATIENT SERVICES | Facility: HOSPITAL | Age: 72
LOS: 1 days | Discharge: HOME | End: 2022-05-31
Payer: MEDICARE

## 2022-05-31 VITALS
OXYGEN SATURATION: 100 % | HEART RATE: 63 BPM | RESPIRATION RATE: 15 BRPM | HEIGHT: 67 IN | WEIGHT: 141.98 LBS | SYSTOLIC BLOOD PRESSURE: 128 MMHG | DIASTOLIC BLOOD PRESSURE: 96 MMHG | TEMPERATURE: 98 F

## 2022-05-31 VITALS
OXYGEN SATURATION: 99 % | DIASTOLIC BLOOD PRESSURE: 80 MMHG | SYSTOLIC BLOOD PRESSURE: 173 MMHG | RESPIRATION RATE: 16 BRPM | HEART RATE: 53 BPM

## 2022-05-31 DIAGNOSIS — R10.11 RIGHT UPPER QUADRANT PAIN: ICD-10-CM

## 2022-05-31 LAB — SARS-COV-2 N GENE NPH QL NAA+PROBE: NOT DETECTED

## 2022-05-31 PROCEDURE — 88342 IMHCHEM/IMCYTCHM 1ST ANTB: CPT | Mod: 26

## 2022-05-31 PROCEDURE — 43259 EGD US EXAM DUODENUM/JEJUNUM: CPT | Mod: 59

## 2022-05-31 PROCEDURE — 43274 ERCP DUCT STENT PLACEMENT: CPT | Mod: 59

## 2022-05-31 PROCEDURE — 43264 ERCP REMOVE DUCT CALCULI: CPT | Mod: 59

## 2022-05-31 PROCEDURE — 74328 X-RAY BILE DUCT ENDOSCOPY: CPT | Mod: 26

## 2022-05-31 PROCEDURE — 88305 TISSUE EXAM BY PATHOLOGIST: CPT | Mod: 26

## 2022-05-31 PROCEDURE — 43261 ENDO CHOLANGIOPANCREATOGRAPH: CPT | Mod: 59

## 2022-05-31 PROCEDURE — 88341 IMHCHEM/IMCYTCHM EA ADD ANTB: CPT | Mod: 26

## 2022-05-31 DEVICE — STENT PANCREAS CATH PUSH 5FRX170CM: Type: IMPLANTABLE DEVICE | Status: FUNCTIONAL

## 2022-05-31 DEVICE — STENT BIL ADVANIX 10FRX5CM PRELOADED: Type: IMPLANTABLE DEVICE | Status: FUNCTIONAL

## 2022-05-31 DEVICE — STENT PANC ZIMMON 5FR 5CM: Type: IMPLANTABLE DEVICE | Status: FUNCTIONAL

## 2022-05-31 DEVICE — HYDRATOME 44: Type: IMPLANTABLE DEVICE | Status: FUNCTIONAL

## 2022-05-31 DEVICE — GWIRE JAGTOME REVOLUTION RX 260CM/0.025IN: Type: IMPLANTABLE DEVICE | Status: FUNCTIONAL

## 2022-05-31 RX ORDER — SODIUM CHLORIDE 9 MG/ML
500 INJECTION, SOLUTION INTRAVENOUS
Refills: 0 | Status: DISCONTINUED | OUTPATIENT
Start: 2022-05-31 | End: 2022-06-14

## 2022-05-31 NOTE — PACU DISCHARGE NOTE - NS MD DISCHARGE NOTE DISCHARGE
Routing refill request to provider for review/approval because:  Drug not on the [facility] refill protocol.    Lexii Nur RN, [facility]           Home

## 2022-06-01 DIAGNOSIS — Z98.890 OTHER SPECIFIED POSTPROCEDURAL STATES: ICD-10-CM

## 2022-06-06 LAB — SURGICAL PATHOLOGY STUDY: SIGNIFICANT CHANGE UP

## 2022-08-01 ENCOUNTER — APPOINTMENT (OUTPATIENT)
Dept: GASTROENTEROLOGY | Facility: HOSPITAL | Age: 72
End: 2022-08-01

## 2022-08-01 ENCOUNTER — RESULT REVIEW (OUTPATIENT)
Age: 72
End: 2022-08-01

## 2022-08-01 ENCOUNTER — OUTPATIENT (OUTPATIENT)
Dept: OUTPATIENT SERVICES | Facility: HOSPITAL | Age: 72
LOS: 1 days | Discharge: HOME | End: 2022-08-01

## 2022-08-01 VITALS
TEMPERATURE: 97 F | WEIGHT: 145.06 LBS | RESPIRATION RATE: 14 BRPM | DIASTOLIC BLOOD PRESSURE: 64 MMHG | HEART RATE: 62 BPM | OXYGEN SATURATION: 100 % | HEIGHT: 67 IN | SYSTOLIC BLOOD PRESSURE: 124 MMHG

## 2022-08-01 VITALS
OXYGEN SATURATION: 100 % | SYSTOLIC BLOOD PRESSURE: 154 MMHG | HEART RATE: 60 BPM | DIASTOLIC BLOOD PRESSURE: 78 MMHG | RESPIRATION RATE: 15 BRPM

## 2022-08-01 DIAGNOSIS — K86.9 DISEASE OF PANCREAS, UNSPECIFIED: ICD-10-CM

## 2022-08-01 LAB — SARS-COV-2 N GENE NPH QL NAA+PROBE: NOT DETECTED

## 2022-08-01 PROCEDURE — 88305 TISSUE EXAM BY PATHOLOGIST: CPT | Mod: 26,59

## 2022-08-01 PROCEDURE — 43275 ERCP REMOVE FORGN BODY DUCT: CPT

## 2022-08-01 PROCEDURE — 43264 ERCP REMOVE DUCT CALCULI: CPT | Mod: 59

## 2022-08-01 PROCEDURE — 88305 TISSUE EXAM BY PATHOLOGIST: CPT | Mod: 26

## 2022-08-01 PROCEDURE — 88112 CYTOPATH CELL ENHANCE TECH: CPT | Mod: 26

## 2022-08-01 PROCEDURE — 43262 ENDO CHOLANGIOPANCREATOGRAPH: CPT | Mod: 59

## 2022-08-01 PROCEDURE — 74328 X-RAY BILE DUCT ENDOSCOPY: CPT | Mod: 26

## 2022-08-01 PROCEDURE — 43261 ENDO CHOLANGIOPANCREATOGRAPH: CPT | Mod: 59

## 2022-08-01 DEVICE — HYDRATOME 44: Type: IMPLANTABLE DEVICE | Status: FUNCTIONAL

## 2022-08-01 DEVICE — CATH BLLN EXTRACT DIST GUIDE WIRE 15MM 3LUM: Type: IMPLANTABLE DEVICE | Status: FUNCTIONAL

## 2022-08-01 NOTE — ASU PATIENT PROFILE, ADULT - FALL HARM RISK - UNIVERSAL INTERVENTIONS
Bed in lowest position, wheels locked, appropriate side rails in place/Call bell, personal items and telephone in reach/Instruct patient to call for assistance before getting out of bed or chair/Non-slip footwear when patient is out of bed/[place] to call system/Physically safe environment - no spills, clutter or unnecessary equipment/Purposeful Proactive Rounding/Room/bathroom lighting operational, light cord in reach

## 2022-08-03 LAB — NON-GYNECOLOGICAL CYTOLOGY STUDY: SIGNIFICANT CHANGE UP

## 2022-08-05 ENCOUNTER — APPOINTMENT (OUTPATIENT)
Dept: MAMMOGRAPHY | Facility: CLINIC | Age: 72
End: 2022-08-05

## 2022-08-05 ENCOUNTER — OUTPATIENT (OUTPATIENT)
Dept: OUTPATIENT SERVICES | Facility: HOSPITAL | Age: 72
LOS: 1 days | End: 2022-08-05
Payer: MEDICARE

## 2022-08-05 DIAGNOSIS — Z00.8 ENCOUNTER FOR OTHER GENERAL EXAMINATION: ICD-10-CM

## 2022-08-05 PROCEDURE — 77067 SCR MAMMO BI INCL CAD: CPT | Mod: 26

## 2022-08-05 PROCEDURE — 77063 BREAST TOMOSYNTHESIS BI: CPT

## 2022-08-05 PROCEDURE — 77063 BREAST TOMOSYNTHESIS BI: CPT | Mod: 26

## 2022-08-05 PROCEDURE — 77067 SCR MAMMO BI INCL CAD: CPT

## 2022-08-08 LAB — SURGICAL PATHOLOGY STUDY: SIGNIFICANT CHANGE UP

## 2022-09-29 ENCOUNTER — APPOINTMENT (OUTPATIENT)
Dept: GASTROENTEROLOGY | Facility: CLINIC | Age: 72
End: 2022-09-29
Payer: MEDICARE

## 2022-09-29 PROCEDURE — XXXXX: CPT | Mod: 1L

## 2022-11-17 ENCOUNTER — APPOINTMENT (OUTPATIENT)
Dept: ULTRASOUND IMAGING | Facility: CLINIC | Age: 72
End: 2022-11-17

## 2022-11-17 PROCEDURE — 76705 ECHO EXAM OF ABDOMEN: CPT

## 2023-02-21 ENCOUNTER — APPOINTMENT (OUTPATIENT)
Dept: ULTRASOUND IMAGING | Facility: CLINIC | Age: 73
End: 2023-02-21
Payer: MEDICARE

## 2023-02-21 PROCEDURE — 76705 ECHO EXAM OF ABDOMEN: CPT

## 2023-03-16 NOTE — ASSESSMENT
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 Stents removed and shes doing well\par GB cannot be removed as a result of scar tissue. \par Recommend MRI/MRCP in the next 2-3 months with labs.\par

## 2023-06-17 ENCOUNTER — APPOINTMENT (OUTPATIENT)
Dept: ULTRASOUND IMAGING | Facility: CLINIC | Age: 73
End: 2023-06-17
Payer: MEDICARE

## 2023-06-17 PROCEDURE — 76705 ECHO EXAM OF ABDOMEN: CPT

## 2023-08-21 ENCOUNTER — APPOINTMENT (OUTPATIENT)
Dept: MAMMOGRAPHY | Facility: CLINIC | Age: 73
End: 2023-08-21
Payer: MEDICARE

## 2023-08-21 PROCEDURE — 77063 BREAST TOMOSYNTHESIS BI: CPT

## 2023-08-21 PROCEDURE — 77067 SCR MAMMO BI INCL CAD: CPT

## 2023-09-25 ENCOUNTER — RESULT REVIEW (OUTPATIENT)
Age: 73
End: 2023-09-25

## 2023-09-25 ENCOUNTER — APPOINTMENT (OUTPATIENT)
Dept: MRI IMAGING | Facility: CLINIC | Age: 73
End: 2023-09-25
Payer: MEDICARE

## 2023-09-25 PROCEDURE — 74183 MRI ABD W/O CNTR FLWD CNTR: CPT

## 2023-09-25 PROCEDURE — A9585: CPT

## 2023-10-09 NOTE — ED ADULT NURSE NOTE - NS ED NURSE RECORD ANOTHER HT AND WT
Yes Zoryve Pregnancy And Lactation Text: It is unknown if this medication can cause problems during pregnancy and breastfeeding.

## 2024-01-15 ENCOUNTER — APPOINTMENT (OUTPATIENT)
Dept: ULTRASOUND IMAGING | Facility: CLINIC | Age: 74
End: 2024-01-15
Payer: MEDICARE

## 2024-01-15 PROCEDURE — 76705 ECHO EXAM OF ABDOMEN: CPT

## 2024-05-22 NOTE — PROGRESS NOTE ADULT - PROBLEM/PLAN-7
Encounter Date: 2024       History     Chief Complaint   Patient presents with    Cough     C/o cough and congestion. Pt had virtual visit yesterday and was prescribed tessalon for cough. Pt not feeling better.     Antonieta Enriquez is a 58 y.o. female  has a past medical history of Hypertension, Hypertrophic cardiomyopathy, and Migraine. presenting to the Emergency Department for Five days of nasal congestion, sinus pressure, dry cough.  Patient had a virtual visit yesterday and was prescribed Tessalon for cough without significant relief.  No fevers.  No body aches.  No sore throat.  No shortness a breath or chest pain.  No nausea, vomiting, diarrhea, constipation, abdominal pain.          The history is provided by the patient.     Review of patient's allergies indicates:  No Known Allergies  Past Medical History:   Diagnosis Date    Hypertension     Hypertrophic cardiomyopathy     Migraine      Past Surgical History:   Procedure Laterality Date    BREAST BIOPSY Right      neg     SECTION      CHOLECYSTECTOMY      GASTRIC BYPASS N/A 2009    HYSTERECTOMY       Family History   Problem Relation Name Age of Onset    Arthritis Mother      Diabetes Mother      Hypertension Mother      Cancer Father      Diabetes Brother      Diabetes Brother       Social History     Tobacco Use    Smoking status: Never     Passive exposure: Never    Smokeless tobacco: Never   Substance Use Topics    Alcohol use: Yes    Drug use: Never     Review of Systems   Constitutional:  Negative for fever.   HENT:  Positive for congestion and sinus pain. Negative for sore throat.    Respiratory:  Positive for cough. Negative for shortness of breath.    Cardiovascular:  Negative for chest pain.   Gastrointestinal:  Negative for nausea.   Genitourinary:  Negative for dysuria.   Musculoskeletal:  Negative for back pain.   Skin:  Negative for rash.   Neurological:  Negative for weakness.   Hematological:  Does not bruise/bleed 
easily.   All other systems reviewed and are negative.      Physical Exam     Initial Vitals [05/22/24 1557]   BP Pulse Resp Temp SpO2   (!) 185/98 86 18 98.5 °F (36.9 °C) 95 %      MAP       --         Physical Exam    Nursing note and vitals reviewed.  Constitutional: She appears well-developed and well-nourished. She is not diaphoretic. No distress.   HENT:   Head: Normocephalic.   Right Ear: Hearing, tympanic membrane and external ear normal.   Left Ear: Hearing, tympanic membrane and external ear normal.   Nose: Nose normal.   Mouth/Throat: Oropharynx is clear and moist.   Eyes: Conjunctivae, EOM and lids are normal. Pupils are equal, round, and reactive to light.   Neck: Neck supple.   Normal range of motion.  Cardiovascular:  Normal rate, regular rhythm and normal heart sounds.           Pulmonary/Chest: Breath sounds normal. No respiratory distress. She has no wheezes. She has no rhonchi.   Abdominal: Abdomen is soft. Bowel sounds are normal. There is no abdominal tenderness. There is no rebound and no guarding.   Musculoskeletal:         General: Normal range of motion.      Cervical back: Normal range of motion and neck supple. No rigidity.     Lymphadenopathy:     She has no cervical adenopathy.   Neurological: She is alert and oriented to person, place, and time. She has normal strength. GCS score is 15. GCS eye subscore is 4. GCS verbal subscore is 5. GCS motor subscore is 6.   Skin: Skin is warm and dry. Capillary refill takes less than 2 seconds. No rash noted.   Psychiatric: She has a normal mood and affect. Her behavior is normal. Judgment and thought content normal.         ED Course   Procedures  Labs Reviewed   INFLUENZA A & B BY MOLECULAR   SARS-COV-2 RNA AMPLIFICATION, QUAL          Imaging Results    None          Medications - No data to display  Medical Decision Making  This is an emergent evaluation of a 58 y.o. female that presents to the Emergency Department for nonproductive cough and 
nasal congestion. The patient is a non-toxic and not acutely ill-appearing, afebrile, and well appearing female. Pertinent physical exam findings above. Appears well hydrated with moist mucus membranes. Neck soft and supple with no meningeal signs. Breath sounds are clear and equal bilaterally. No tachypnea or respiratory distress and no evidence of hypoxia or cyanosis. Vital signs are reassuring.      My overall impression is viral URI. Differential Diagnosis: Including but not limited to Sepsis, meningitis, nasal/aspirated foreign body, OM, OE, nasal polyp, bacterial sinusitis, allergic rhinitis, peritonsillar abscess, retropharyngeal abscess, epiglottitis, bacterial/viral pneumonia, bacterial/viral pharyngitis, croup, bronchiolitis, influenza, viral syndrome     Discharge Meds/Instructions: Supportive care, Tylenol/Ibuprofen PRN, Hydration.      There does not appear to be any indication for further emergent testing, observation, or hospitalization at this time. A mutual shared decision making discussion was had with the patient. Patient appears stable for and is comfortable with discharge home. The diagnosis, treatment plan, instructions for follow-up as well as ED return precautions were discussed. Advised to follow-up with PCP for outpatient follow-up in 2-3 days. Signs and symptoms that would warrant immediate return to ED were reviewed prior to discharge. All questions and concerns were asked, answered, and addressed. Patient expressed understanding and agreement with the plan.         Amount and/or Complexity of Data Reviewed  Labs:  Decision-making details documented in ED Course.    Risk  OTC drugs.               ED Course as of 05/22/24 1640   Wed May 22, 2024   1629 Influenza A, Molecular: Negative [LH]   1629 Influenza B, Molecular: Negative [LH]   1629 SARS-CoV-2 RNA, Amplification, Qual: Negative [LH]      ED Course User Index  [LH] Huyen Turner PA-C                             Clinical 
Impression:  Final diagnoses:  [J06.9] Viral URI (Primary)          ED Disposition Condition    Discharge Stable          ED Prescriptions       Medication Sig Dispense Start Date End Date Auth. Provider    dextromethorphan-guaiFENesin  mg/5 mL Liqd Take 5 mLs by mouth every 6 (six) hours. for 10 days 473 mL 5/22/2024 6/1/2024 Huyen Turner PA-C    fluticasone propionate (FLONASE) 50 mcg/actuation nasal spray 2 sprays (100 mcg total) by Each Nostril route 2 (two) times daily as needed for Rhinitis. 15 g 5/22/2024 -- Huyen Turner PA-C    cetirizine (ZYRTEC) 10 MG tablet Take 1 tablet (10 mg total) by mouth once daily. 30 tablet 5/22/2024 6/21/2024 Huyen Turner PA-C    acetaminophen (TYLENOL) 500 MG tablet Take 2 tablets (1,000 mg total) by mouth 4 (four) times daily. for 14 days 112 tablet 5/22/2024 6/5/2024 Huyen Turner PA-C    ibuprofen (ADVIL,MOTRIN) 800 MG tablet Take 1 tablet (800 mg total) by mouth every 6 (six) hours as needed for Pain. 50 tablet 5/22/2024 6/4/2024 Huyen Turner PA-C          Follow-up Information    None          Huyen Turner PA-C  05/22/24 1640    
DISPLAY PLAN FREE TEXT
DISPLAY PLAN FREE TEXT

## 2024-06-01 ENCOUNTER — APPOINTMENT (OUTPATIENT)
Dept: ULTRASOUND IMAGING | Facility: CLINIC | Age: 74
End: 2024-06-01
Payer: MEDICARE

## 2024-06-01 PROCEDURE — 76700 US EXAM ABDOM COMPLETE: CPT

## 2024-09-28 ENCOUNTER — APPOINTMENT (OUTPATIENT)
Dept: MRI IMAGING | Facility: CLINIC | Age: 74
End: 2024-09-28

## 2024-09-28 ENCOUNTER — RESULT REVIEW (OUTPATIENT)
Age: 74
End: 2024-09-28

## 2024-09-28 PROCEDURE — 74183 MRI ABD W/O CNTR FLWD CNTR: CPT

## 2024-10-01 ENCOUNTER — APPOINTMENT (OUTPATIENT)
Dept: MAMMOGRAPHY | Facility: CLINIC | Age: 74
End: 2024-10-01
Payer: MEDICARE

## 2024-10-01 PROCEDURE — 77067 SCR MAMMO BI INCL CAD: CPT

## 2024-10-01 PROCEDURE — 77063 BREAST TOMOSYNTHESIS BI: CPT

## 2025-03-31 ENCOUNTER — APPOINTMENT (OUTPATIENT)
Dept: ULTRASOUND IMAGING | Facility: CLINIC | Age: 75
End: 2025-03-31
Payer: MEDICARE

## 2025-03-31 PROCEDURE — 76705 ECHO EXAM OF ABDOMEN: CPT

## 2025-07-08 NOTE — H&P PST ADULT - FUNCTIONAL ASSESSMENT - BASIC MOBILITY SCORE.
Call from pt stating that his insurance will not cover Tresiba but he believes that they will cover Levemir     Pt stated that he would call his insurance to be sure that they will cover Levemir     # [phone]   24

## (undated) DEVICE — Device

## (undated) DEVICE — SYR LUER SLIP TIP 30CC

## (undated) DEVICE — VENODYNE/SCD SLEEVE CALF LARGE

## (undated) DEVICE — ESU ERBE 044850: Type: DURABLE MEDICAL EQUIPMENT

## (undated) DEVICE — SNARE POLYP HEXAGONAL SM 13X2.4X240

## (undated) DEVICE — DRSG 2X2

## (undated) DEVICE — KIT ENDO PROCEDURE CUST W/VLV

## (undated) DEVICE — SNARE POLYP SENS 27MM 240CM

## (undated) DEVICE — SOL IRR POUR H2O 1000ML

## (undated) DEVICE — SOL IRR POUR NS 0.9% 500ML

## (undated) DEVICE — FORCEP RADIAL JAW 4 PEDIATRIC W NDL 1.8MM 2MM 160CM DISP

## (undated) DEVICE — BASIN EMESIS 10IN GRADUATED MAUVE

## (undated) DEVICE — SYR LUER LOK 10CC

## (undated) DEVICE — DRSG BANDAID 0.75X3"

## (undated) DEVICE — SUT POLYSORB 0 30" GU-46

## (undated) DEVICE — ELCTR GROUNDING PAD ADULT COVIDIEN

## (undated) DEVICE — INJ SYS RAP REFIL

## (undated) DEVICE — SENSOR O2 FINGER ADULT

## (undated) DEVICE — PACK IV START WITH CHG

## (undated) DEVICE — TUBING MEDI-VAC W MAXIGRIP CONNECTORS 1/4"X6'

## (undated) DEVICE — ELCTR ECG CONDUCTIVE ADHESIVE

## (undated) DEVICE — PLV-SCD MACHINE: Type: DURABLE MEDICAL EQUIPMENT

## (undated) DEVICE — TROCAR COVIDIEN VERSAPORT BLADELESS OPTICAL 5MM STANDARD

## (undated) DEVICE — PLV/PSP-SUCTION IRRIGATOR STRYKER: Type: DURABLE MEDICAL EQUIPMENT

## (undated) DEVICE — D HELP - CLEARVIEW CLEARIFY SYSTEM

## (undated) DEVICE — NDL HYPO SAFE 22G X 1" (BLACK)

## (undated) DEVICE — PLV/PSP-ESU FORCE2 FIL 16736T: Type: DURABLE MEDICAL EQUIPMENT

## (undated) DEVICE — SALIVA EJECTOR (BLUE)

## (undated) DEVICE — DENTURE CUP PINK

## (undated) DEVICE — BLADE SCALPEL SAFETYLOCK #15

## (undated) DEVICE — TUBING STRYKER PNEUMOCLEAR HIGH FLOW

## (undated) DEVICE — VENODYNE/SCD SLEEVE CALF MEDIUM

## (undated) DEVICE — LINE BREATHE SAMPLNG

## (undated) DEVICE — GOWN LG

## (undated) DEVICE — UNDERPAD LINEN SAVER 17 X 24"

## (undated) DEVICE — DRSG CURITY GAUZE SPONGE 4 X 4" 12-PLY

## (undated) DEVICE — WARMING BLANKET LOWER ADULT

## (undated) DEVICE — PLV-STRYKER LAPAROSCOPE 10MM 30 DEGREE: Type: DURABLE MEDICAL EQUIPMENT

## (undated) DEVICE — ELCTR BOVIE TIP BLADE INSULATED 2.75" EDGE

## (undated) DEVICE — TROCAR COVIDIEN VERSAONE BLUNT TIP HASSAN 12MM

## (undated) DEVICE — POSITIONER FOAM SLOTTED HEAD CRADLE (PINK)

## (undated) DEVICE — PACK GENERAL LAPAROSCOPY

## (undated) DEVICE — WARMING BLANKET UPPER ADULT

## (undated) DEVICE — BRUSH CYTO RAP EXCHG 3MM

## (undated) DEVICE — TUBING IV SET GRAVITY 3Y 100" MACRO

## (undated) DEVICE — CATH IV SAFE BC 22G X 1" (BLUE)

## (undated) DEVICE — ENDOCATCH 10MM SPECIMEN POUCH

## (undated) DEVICE — SYR LUER LOK 3CC

## (undated) DEVICE — SHEARS COVIDIEN ENDO SHEAR 5MM X 31CM W UNIPOLAR CAUTERY

## (undated) DEVICE — BITE BLOCK ADULT 20 X 27MM (GREEN)

## (undated) DEVICE — TROCAR COVIDIEN VERSAPORT BLADELESS OPTICAL 11MM STANDARD

## (undated) DEVICE — CLAMP BX HOT RAD JAW 3

## (undated) DEVICE — GLV 7 PROTEXIS (WHITE)

## (undated) DEVICE — NDL HYPO REGULAR BEVEL 25G X 1.5" (BLUE)

## (undated) DEVICE — TROCAR COVIDIEN VERSAONE FIXATION CANNULA 5MM

## (undated) DEVICE — BIOPSY FORCEP COLD DISP

## (undated) DEVICE — DRAPE TOWEL BLUE 17" X 24"

## (undated) DEVICE — BIOPSY FORCEP RADIAL JAW 4 STANDARD WITH NEEDLE

## (undated) DEVICE — SOL INJ NS 0.9% 500ML 1-PORT

## (undated) DEVICE — PLV-STRYKER LAPAROSCOPE 5MM 30 DEGREE: Type: DURABLE MEDICAL EQUIPMENT

## (undated) DEVICE — DRAPE 3/4 SHEET W REINFORCEMENT 56X77"

## (undated) DEVICE — GOWN XL W TOWEL

## (undated) DEVICE — LOK DVC RX AND BIOPSY

## (undated) DEVICE — DRSG CURITY GAUZE SPONGE 4 X 4" 12-PLY NON-STERILE

## (undated) DEVICE — SPONGE ENDO PEANUT 5MM

## (undated) DEVICE — TUBING SUCTION 20FT

## (undated) DEVICE — DRSG DERMABOND 0.7ML

## (undated) DEVICE — CONTAINER FORMALIN 80ML YELLOW

## (undated) DEVICE — INFINITY SAMPLING DEVICE ERCP 9FRX200CM

## (undated) DEVICE — SUT MONOCRYL 4-0 27" PS-2 UNDYED

## (undated) DEVICE — ORGANIZER MIO MEDICAL DEVICE DISP

## (undated) DEVICE — TUBING SUCTION NONCONDUCTIVE 6MM X 12FT

## (undated) DEVICE — OMNIPAQUE 300  30ML

## (undated) DEVICE — SUCTION YANKAUER NO CONTROL VENT

## (undated) DEVICE — LUBRICATING JELLY HR ONE SHOT 3G

## (undated) DEVICE — FACESHIELD FULL VISOR

## (undated) DEVICE — PLASTIC SOLUTION BOWL 160Z

## (undated) DEVICE — SOL IRR BAG NS 0.9% 3000ML